# Patient Record
Sex: MALE | Race: WHITE | NOT HISPANIC OR LATINO | Employment: FULL TIME | URBAN - METROPOLITAN AREA
[De-identification: names, ages, dates, MRNs, and addresses within clinical notes are randomized per-mention and may not be internally consistent; named-entity substitution may affect disease eponyms.]

---

## 2019-05-07 ENCOUNTER — OFFICE VISIT (OUTPATIENT)
Dept: URGENT CARE | Facility: CLINIC | Age: 39
End: 2019-05-07
Payer: COMMERCIAL

## 2019-05-07 VITALS
HEIGHT: 68 IN | HEART RATE: 70 BPM | DIASTOLIC BLOOD PRESSURE: 84 MMHG | BODY MASS INDEX: 23.49 KG/M2 | OXYGEN SATURATION: 98 % | WEIGHT: 155 LBS | TEMPERATURE: 99 F | SYSTOLIC BLOOD PRESSURE: 120 MMHG

## 2019-05-07 DIAGNOSIS — K52.9 GASTROENTERITIS: ICD-10-CM

## 2019-05-07 DIAGNOSIS — J00 ACUTE NASOPHARYNGITIS: Primary | ICD-10-CM

## 2019-05-07 PROCEDURE — 99203 PR OFFICE/OUTPT VISIT, NEW, LEVL III, 30-44 MIN: ICD-10-PCS | Mod: S$GLB,,, | Performed by: PHYSICIAN ASSISTANT

## 2019-05-07 PROCEDURE — 3008F PR BODY MASS INDEX (BMI) DOCUMENTED: ICD-10-PCS | Mod: CPTII,S$GLB,, | Performed by: PHYSICIAN ASSISTANT

## 2019-05-07 PROCEDURE — 3008F BODY MASS INDEX DOCD: CPT | Mod: CPTII,S$GLB,, | Performed by: PHYSICIAN ASSISTANT

## 2019-05-07 PROCEDURE — 99203 OFFICE O/P NEW LOW 30 MIN: CPT | Mod: S$GLB,,, | Performed by: PHYSICIAN ASSISTANT

## 2019-05-07 RX ORDER — ONDANSETRON 4 MG/1
4 TABLET, ORALLY DISINTEGRATING ORAL EVERY 6 HOURS PRN
Qty: 20 TABLET | Refills: 0 | Status: SHIPPED | OUTPATIENT
Start: 2019-05-07 | End: 2022-06-03

## 2019-05-07 RX ORDER — PREDNISONE 20 MG/1
60 TABLET ORAL DAILY
Qty: 15 TABLET | Refills: 0 | Status: SHIPPED | OUTPATIENT
Start: 2019-05-07 | End: 2019-05-12

## 2019-05-07 NOTE — PATIENT INSTRUCTIONS
"- Rest.    - Drink plenty of fluids.    - Tylenol or Ibuprofen as directed as needed for fever/pain.   - Take over-the-counter claritin, zyrtec, allegra, or xyzal as directed.  You should NOT use a decongestant form (D) of this medication if you have a history of hypertension or heart disease.  - Use over the counter Flonase as directed for sinus congestion and postnasal drip.  - use nasal saline prior to Flonase.  - stop using Flonase if you developed nosebleeds.  - Use Ocean Spray Nasal Saline 1-3 puffs each nostril every 2-3 hours then blow out onto tissue. This is to irrigate the nasal passage way to clear the sinus openings. Use until sinus problem resolved.  - Take over the counter cough medication as directed as needed for cough.  You should avoid ones with "D" if you have high blood pressure.    - Follow up with your PCP or specialty clinic as directed in the next 1-2 weeks if not improved or as needed.  You can call [phone] to schedule an appointment with the appropriate provider.    - Go to the ED if your symptoms worsen.  - You must understand that you have received an Urgent Care treatment only and that you may be released before all of your medical problems are known or treated.   - You, the patient, will arrange for follow up care as instructed.   - If your condition worsens or fails to improve we recommend that you receive another evaluation at the ER immediately or contact your PCP to discuss your concerns or return here.       Adult Self-Care for Colds  Colds are caused by viruses. They can't be cured with antibiotics. However, you can ease symptoms and support your body's efforts to heal itself.  No matter which symptoms you have, be sure to:  · Drink plenty of fluids (water or clear soup)  · Stop smoking and drinking alcohol  · Get plenty of rest    Understand a fever  · Take your temperature several times a day. If your fever is 100.4°F (38.0°C) for more than a day, call your healthcare " provider.  · Relax, lie down. Go to bed if you want. Just get off your feet and rest. Also, drink plenty of fluids to avoid dehydration.  · Take acetaminophen or a nonsteroidal anti-inflammatory agent (NSAID), such as ibuprofen.  Treat a troubled nose kindly  · Breathe steam or heated humidified air to open blocked nasal passages.  a hot shower or use a vaporizer. Be careful not to get burned by the steam.  · Saline nasal sprays and decongestant tablets help open a stuffy nose. Antihistamines can also help, but they can cause side effects such as drowsiness and drying of the eyes, nose, and mouth.  Soothe a sore throat and cough  · Gargle every 2 hours with 1/4 teaspoon of salt dissolved in 1/2 cup of warm water. Suck on throat lozenges and cough drops to moisten your throat.  · Cough medicines are available but it is unclear how well they actually work.  · Take acetaminophen or an NSAID, such as ibuprofen, to ease throat pain  Ease digestive problems  · Put fluids back into your body. Take frequent sips of clear liquids such as water or broth. Avoid drinks that have a lot of sugar in them, such as juices and sodas. These can make diarrhea worse. Older children and adults can drink sports drinks.  · As your appetite returns, you can resume your normal diet. Ask your healthcare provider if there are any foods you should avoid.  When to seek medical care  When you first notice symptoms, ask your healthcare provider if antiviral medicines are appropriate. Antibiotics should not be taken for colds or flu. Also, call your healthcare provider if you have any of the following symptoms or if you aren't feeling better after 7 days:  · Shortness of breath  · Pain or pressure in the chest or belly (abdomen)  · Worsening symptoms, especially after a period of improvement  · Fever of 100.4°F  (38.0°C) or higher, or fever that doesn't go down with medicine  · Sudden dizziness or confusion  · Severe or continued  vomiting  · Signs of dehydration, including extreme thirst, dark urine, infrequent urination, dry mouth  · Spotted, red, or very sore throat   Date Last Reviewed: 12/1/2016 © 2000-2017 [employer]. [address] [place] [address], [place] [address]. All rights reserved. This information is not intended as a substitute for professional medical care. Always follow your healthcare professional's instructions.        Viral Upper Respiratory Illness (Adult)  You have a viral upper respiratory illness (URI), which is another term for the common cold. This illness is contagious during the first few days. It is spread through the air by coughing and sneezing. It may also be spread by direct contact (touching the sick person and then touching your own eyes, nose, or mouth). Frequent handwashing will decrease risk of spread. Most viral illnesses go away within 7 to 10 days with rest and simple home remedies. Sometimes the illness may last for several weeks. Antibiotics will not kill a virus, and they are generally not prescribed for this condition.    Home care  · If symptoms are severe, rest at home for the first 2 to 3 days. When you resume activity, don't let yourself get too tired.  · Avoid being exposed to cigarette smoke (yours or others).  · You may use acetaminophen or ibuprofen to control pain and fever, unless another medicine was prescribed. (Note: If you have chronic liver or kidney disease, have ever had a stomach ulcer or gastrointestinal bleeding, or are taking blood-thinning medicines, talk with your healthcare provider before using these medicines.) Aspirin should never be given to anyone under 18 years of age who is ill with a viral infection or fever. It may cause severe liver or brain damage.  · Your appetite may be poor, so a light diet is fine. Avoid dehydration by drinking 6 to 8 glasses of fluids per day (water, soft drinks, juices, tea, or soup). Extra fluids will help loosen secretions in the  nose and lungs.  · Over-the-counter cold medicines will not shorten the length of time youre sick, but they may be helpful for the following symptoms: cough, sore throat, and nasal and sinus congestion. (Note: Do not use decongestants if you have high blood pressure.)  Follow-up care  Follow up with your healthcare provider, or as advised.  When to seek medical advice  Call your healthcare provider right away if any of these occur:  · Cough with lots of colored sputum (mucus)  · Severe headache; face, neck, or ear pain  · Difficulty swallowing due to throat pain  · Fever of 100.4°F (38°C)  Call 911, or get immediate medical care  Call emergency services right away if any of these occur:  · Chest pain, shortness of breath, wheezing, or difficulty breathing  · Coughing up blood  · Inability to swallow due to throat pain  Date Last Reviewed: 9/13/2015  © 7453-7996 [employer]. [address] [place] [address]. All rights reserved. This information is not intended as a substitute for professional medical care. Always follow your healthcare professional's instructions.        Food Poisoning or Viral Gastroenteritis (Adult)  You have a stomach illness that is likely either food poisoning or viral gastroenteritis.  Food poisoning is illness that is passed along in food and affects the stomach and intestinal tract. It usually occurs from 1 to 24 hours after eating food that has spoiled. When it happens within a few hours of eating, it is often caused by toxins from bacteria in food that has not been cooked or refrigerated properly.  Viral gastroenteritis is also an illness from a virus that affects the stomach and intestinal tract. Many people call it the stomach flu, but it has nothing to do with influenza. In fact, it can happen from food poisoning, but it can also happen when germs are passed from person-to-person or contaminated surface (toothbrush, cutting board, toilet) to a person.  Either  illness can cause these symptoms:  · Abdominal pain and cramping  · Nausea  · Vomiting  · Diarrhea  · Fever and chills  · Loss of bowel control  The symptoms of food poisoning usually last 1 to 2 days. The symptoms of viral gastroenteritis can sometimes last up to 7 days but usually end sooner.  Antibiotics are not effective for either illness.  Other causes of gastroenteritis include bacteria and parasites which are not discussed here.  Home care  Follow all instructions given by your healthcare provider. Rest at home for the next 24 hours, or until you feel better. Avoid caffeine, tobacco, and alcohol. These can make diarrhea, cramping, and pain worse.  If taking medicines:  · Dont take over-the-counter diarrhea or nausea medicines unless your healthcare provider tells you to.  · You may use acetaminophen or NSAID medicines like ibuprofen or naproxen to reduce pain and fever. Dont use these if you have chronic liver or kidney disease, or ever had a stomach ulcer or GI bleeding. Talk with your healthcare provider first. Don't use NSAID medicines if you are already taking one for another condition (like arthritis) or are on daily aspirin therapy (such as for heart disease or after a stroke).  To prevent the spread of illness:  · Remember that washing with soap and water is the best way to prevent the spread of infection. Wash your hands before and after caring for a sick person.  · Clean the toilet after each use.  · Wash your hands or use alcohol-based hand  before eating.  · Wash your hands or use alcohol-based hand  before and after preparing food. Keep in mind that people with diarrhea or vomiting should not prepare food for others.  · Wash your hands or use alcohol-based hand  after using cutting boards, counter-tops, and knives (and other utensils) that have been in contact with raw foods.  · Wash and then peel fruits and vegetables.  · Keep uncooked meats away from cooked and  ready-to-eat foods.  · Use a food thermometer when cooking. Cook poultry to at least 165°F (74°C). Cook ground meat (beef, veal, pork, lamb) to at least 160°F (71°C). Cook fresh beef, veal, lamb, and pork to at least 145°F (63°C).  · Dont eat raw or undercooked eggs (poached or aniket side up), poultry, meat or unpasteurized milk and juices.  Food and drinks  The main goal while treating vomiting or diarrhea is to prevent dehydration. This is done by taking small amounts of liquids often.  · Keep in mind that liquids are more important than food right now.  · Drink only small amounts of liquids at a time.  · Dont force yourself to eat, especially if you are having cramping, vomiting, or diarrhea. Dont eat large amounts at a time, even if you are hungry.  · If you eat, avoid fatty, greasy, spicy, or fried foods.  · Dont eat dairy foods or drink milk if you have diarrhea. These can make diarrhea worse.  The first 24 hours you can try:  · Oral rehydration solutions, available at grocery stores and pharmacies. Sports drinks are not a good choice if you are very dehydrated. They have too much sugar and not enough electrolytes.  · Soft drinks without caffeine  · Ginger ale  · Water (plain or flavored)  · Decaf tea or coffee  · Clear broth, consommé, or bouillon  · Gelatin, popsicles, or frozen fruit juice bars   The second 24 hours, if you are feeling better, you can add:  · Hot cereal, plain toast, bread, rolls, or crackers  · Plain noodles, rice, mashed potatoes, chicken noodle soup, or rice soup  · Unsweetened canned fruit (no pineapple)  · Bananas  As you recover:  · Limit fat intake to less than 15 grams per day. Dont eat margarine, butter, oils, mayonnaise, sauces, gravies, fried foods, peanut butter, meat, poultry, or fish.  · Limit fiber. Dont eat raw or cooked vegetables, fresh fruits except bananas, and bran cereals.  · Limit caffeine and chocolate.  · Dont use spices or seasonings except salt.  · Resume a  normal diet over time, as you feel better and your symptoms improve.  · If the symptoms come back, go back to a simple diet or clear liquids.  Follow-up care  Follow up with your healthcare provider, or as advised. If a stool sample was taken or cultures were done, call the healthcare provider for the results as instructed.  Call 911  Call 911 if you have any of these symptoms:  · Trouble breathing  · Confusion  · Extreme drowsiness or trouble walking  · Loss of consciousness  · Rapid heart rate  · Chest pain  · Stiff neck  · Seizure  When to seek medical advice  Call your healthcare provider right away if any of these occur:  · Abdominal pain that gets worse  · Constant lower right abdominal pain  · Continued vomiting and inability to keep liquids down  · Diarrhea more than 5 times a day  · Blood in vomit or stool  · Dark urine or no urine for 8 hours, dry mouth and tongue, tiredness, weakness, or dizziness  · New rash  · You dont get better in 2 to 3 days  · Fever of 100.4°F (38°C) or higher that doesnt get lower with medicine  · You have new symptoms of arthritis  Date Last Reviewed: 1/3/2016  © 0354-8849 [employer]. [address] [place] [address], [place] [address]. All rights reserved. This information is not intended as a substitute for professional medical care. Always follow your healthcare professional's instructions.

## 2019-05-07 NOTE — LETTER
May 7, 2019      [facility] [address] [facility] [place] [address]  Phone: [phone]  Fax: [phone]       Patient: Shady Ferreira   YOB: 1980  Date of Visit: 05/07/2019    To Whom It May Concern:    Shady Ferreira  was at [facility] on 05/07/2019. He may return to work/school on 05/08/2019 with no restrictions. If you have any questions or concerns, or if I can be of further assistance, please do not hesitate to contact me.    Sincerely,        Ashlyn Evans PA-C

## 2019-05-07 NOTE — PROGRESS NOTES
"Subjective:       Patient ID: Shady Ferreira is a 38 y.o. male.    Vitals:  height is 5' 8" (1.727 m) and weight is 70.3 kg (155 lb). His temperature is 98.6 °F (37 °C). His blood pressure is 120/84 and his pulse is 70. His oxygen saturation is 98%.     Chief Complaint: URI    This is a 38 y.o. male   with Past Medical History:  No date: Herpes genitalia   and Past Surgical History:  No date: FINGER SURGERY; Left      Comment:  index  who presents today with a chief complaint of a cough,post nasal drip with sinus pressure, congestion, and sneezing that started yesterday morning.  He has had some mild sore throat.  No fever.  He also reports 2 episodes of diarrhea that started this morning that he describes as loose stool.  He reports some nausea as well, but no vomiting.      URI    This is a new problem. The current episode started in the past 7 days. The problem has been gradually worsening. There has been no fever. Associated symptoms include congestion, coughing, diarrhea, headaches, nausea, sinus pain, sneezing and a sore throat. Pertinent negatives include no abdominal pain, chest pain, ear pain, rash, vomiting or wheezing. He has tried decongestant (mucinex d) for the symptoms. The treatment provided mild relief.       Constitution: Negative for chills, sweating, fatigue and fever.   HENT: Positive for congestion, postnasal drip, sinus pain, sinus pressure and sore throat. Negative for ear pain and voice change.    Neck: Negative for painful lymph nodes.   Cardiovascular: Negative for chest pain.   Eyes: Negative for eye redness.   Respiratory: Positive for cough and sputum production. Negative for chest tightness, bloody sputum, COPD, shortness of breath, stridor, wheezing and asthma.    Gastrointestinal: Positive for nausea and diarrhea. Negative for abdominal pain and vomiting.   Musculoskeletal: Negative for muscle ache.   Skin: Negative for rash and erythema.   Allergic/Immunologic: Positive for " sneezing. Negative for seasonal allergies and asthma.   Neurological: Positive for headaches.   Hematologic/Lymphatic: Negative for swollen lymph nodes.       Objective:      Physical Exam   Constitutional: He is oriented to person, place, and time. He appears well-developed and well-nourished. No distress.   HENT:   Head: Normocephalic and atraumatic.   Right Ear: Hearing, tympanic membrane, external ear and ear canal normal.   Left Ear: Hearing, tympanic membrane, external ear and ear canal normal.   Nose: Mucosal edema present. Right sinus exhibits no maxillary sinus tenderness and no frontal sinus tenderness. Left sinus exhibits no maxillary sinus tenderness and no frontal sinus tenderness.   Mouth/Throat: Uvula is midline and oropharynx is clear and moist.   Eyes: Conjunctivae are normal.   Neck: Normal range of motion. Neck supple.   Cardiovascular: Normal rate and regular rhythm. Exam reveals no gallop and no friction rub.   No murmur heard.  Pulmonary/Chest: Effort normal and breath sounds normal. He has no wheezes. He has no rales.   Abdominal: Soft. Bowel sounds are normal. There is no tenderness.   Musculoskeletal: Normal range of motion.   Neurological: He is alert and oriented to person, place, and time.   Skin: Skin is warm and dry. No rash noted. No erythema.   Psychiatric: He has a normal mood and affect. His behavior is normal. Judgment and thought content normal.   Nursing note and vitals reviewed.      Assessment:       1. Acute nasopharyngitis    2. Gastroenteritis        Plan:         Acute nasopharyngitis  -     predniSONE (DELTASONE) 20 MG tablet; Take 3 tablets (60 mg total) by mouth once daily. for 5 days  Dispense: 15 tablet; Refill: 0    Gastroenteritis  -     ondansetron (ZOFRAN-ODT) 4 MG TbDL; Take 1 tablet (4 mg total) by mouth every 6 (six) hours as needed (nausea).  Dispense: 20 tablet; Refill: 0        Shady QUACH was seen today for uri.    Diagnoses and all orders for this  "visit:    Acute nasopharyngitis  -     predniSONE (DELTASONE) 20 MG tablet; Take 3 tablets (60 mg total) by mouth once daily. for 5 days    Gastroenteritis  -     ondansetron (ZOFRAN-ODT) 4 MG TbDL; Take 1 tablet (4 mg total) by mouth every 6 (six) hours as needed (nausea).      Patient Instructions     - Rest.    - Drink plenty of fluids.    - Tylenol or Ibuprofen as directed as needed for fever/pain.   - Take over-the-counter claritin, zyrtec, allegra, or xyzal as directed.  You should NOT use a decongestant form (D) of this medication if you have a history of hypertension or heart disease.  - Use over the counter Flonase as directed for sinus congestion and postnasal drip.  - use nasal saline prior to Flonase.  - stop using Flonase if you developed nosebleeds.  - Use Ocean Spray Nasal Saline 1-3 puffs each nostril every 2-3 hours then blow out onto tissue. This is to irrigate the nasal passage way to clear the sinus openings. Use until sinus problem resolved.  - Take over the counter cough medication as directed as needed for cough.  You should avoid ones with "D" if you have high blood pressure.    - Follow up with your PCP or specialty clinic as directed in the next 1-2 weeks if not improved or as needed.  You can call [phone] to schedule an appointment with the appropriate provider.    - Go to the ED if your symptoms worsen.  - You must understand that you have received an Urgent Care treatment only and that you may be released before all of your medical problems are known or treated.   - You, the patient, will arrange for follow up care as instructed.   - If your condition worsens or fails to improve we recommend that you receive another evaluation at the ER immediately or contact your PCP to discuss your concerns or return here.       Adult Self-Care for Colds  Colds are caused by viruses. They can't be cured with antibiotics. However, you can ease symptoms and support your body's efforts to heal " itself.  No matter which symptoms you have, be sure to:  · Drink plenty of fluids (water or clear soup)  · Stop smoking and drinking alcohol  · Get plenty of rest    Understand a fever  · Take your temperature several times a day. If your fever is 100.4°F (38.0°C) for more than a day, call your healthcare provider.  · Relax, lie down. Go to bed if you want. Just get off your feet and rest. Also, drink plenty of fluids to avoid dehydration.  · Take acetaminophen or a nonsteroidal anti-inflammatory agent (NSAID), such as ibuprofen.  Treat a troubled nose kindly  · Breathe steam or heated humidified air to open blocked nasal passages.  a hot shower or use a vaporizer. Be careful not to get burned by the steam.  · Saline nasal sprays and decongestant tablets help open a stuffy nose. Antihistamines can also help, but they can cause side effects such as drowsiness and drying of the eyes, nose, and mouth.  Soothe a sore throat and cough  · Gargle every 2 hours with 1/4 teaspoon of salt dissolved in 1/2 cup of warm water. Suck on throat lozenges and cough drops to moisten your throat.  · Cough medicines are available but it is unclear how well they actually work.  · Take acetaminophen or an NSAID, such as ibuprofen, to ease throat pain  Ease digestive problems  · Put fluids back into your body. Take frequent sips of clear liquids such as water or broth. Avoid drinks that have a lot of sugar in them, such as juices and sodas. These can make diarrhea worse. Older children and adults can drink sports drinks.  · As your appetite returns, you can resume your normal diet. Ask your healthcare provider if there are any foods you should avoid.  When to seek medical care  When you first notice symptoms, ask your healthcare provider if antiviral medicines are appropriate. Antibiotics should not be taken for colds or flu. Also, call your healthcare provider if you have any of the following symptoms or if you aren't feeling  better after 7 days:  · Shortness of breath  · Pain or pressure in the chest or belly (abdomen)  · Worsening symptoms, especially after a period of improvement  · Fever of 100.4°F  (38.0°C) or higher, or fever that doesn't go down with medicine  · Sudden dizziness or confusion  · Severe or continued vomiting  · Signs of dehydration, including extreme thirst, dark urine, infrequent urination, dry mouth  · Spotted, red, or very sore throat   Date Last Reviewed: 12/1/2016 © 2000-2017 [employer]. [address] [place] [id number]. All rights reserved. This information is not intended as a substitute for professional medical care. Always follow your healthcare professional's instructions.        Viral Upper Respiratory Illness (Adult)  You have a viral upper respiratory illness (URI), which is another term for the common cold. This illness is contagious during the first few days. It is spread through the air by coughing and sneezing. It may also be spread by direct contact (touching the sick person and then touching your own eyes, nose, or mouth). Frequent handwashing will decrease risk of spread. Most viral illnesses go away within 7 to 10 days with rest and simple home remedies. Sometimes the illness may last for several weeks. Antibiotics will not kill a virus, and they are generally not prescribed for this condition.    Home care  · If symptoms are severe, rest at home for the first 2 to 3 days. When you resume activity, don't let yourself get too tired.  · Avoid being exposed to cigarette smoke (yours or others).  · You may use acetaminophen or ibuprofen to control pain and fever, unless another medicine was prescribed. (Note: If you have chronic liver or kidney disease, have ever had a stomach ulcer or gastrointestinal bleeding, or are taking blood-thinning medicines, talk with your healthcare provider before using these medicines.) Aspirin should never be given to anyone under 18 years of  age who is ill with a viral infection or fever. It may cause severe liver or brain damage.  · Your appetite may be poor, so a light diet is fine. Avoid dehydration by drinking 6 to 8 glasses of fluids per day (water, soft drinks, juices, tea, or soup). Extra fluids will help loosen secretions in the nose and lungs.  · Over-the-counter cold medicines will not shorten the length of time youre sick, but they may be helpful for the following symptoms: cough, sore throat, and nasal and sinus congestion. (Note: Do not use decongestants if you have high blood pressure.)  Follow-up care  Follow up with your healthcare provider, or as advised.  When to seek medical advice  Call your healthcare provider right away if any of these occur:  · Cough with lots of colored sputum (mucus)  · Severe headache; face, neck, or ear pain  · Difficulty swallowing due to throat pain  · Fever of 100.4°F (38°C)  Call 911, or get immediate medical care  Call emergency services right away if any of these occur:  · Chest pain, shortness of breath, wheezing, or difficulty breathing  · Coughing up blood  · Inability to swallow due to throat pain  Date Last Reviewed: 9/13/2015  © 8275-8554 [employer]. [address] [place] [address], [place] [address]. All rights reserved. This information is not intended as a substitute for professional medical care. Always follow your healthcare professional's instructions.        Food Poisoning or Viral Gastroenteritis (Adult)  You have a stomach illness that is likely either food poisoning or viral gastroenteritis.  Food poisoning is illness that is passed along in food and affects the stomach and intestinal tract. It usually occurs from 1 to 24 hours after eating food that has spoiled. When it happens within a few hours of eating, it is often caused by toxins from bacteria in food that has not been cooked or refrigerated properly.  Viral gastroenteritis is also an illness from a virus that affects the  stomach and intestinal tract. Many people call it the stomach flu, but it has nothing to do with influenza. In fact, it can happen from food poisoning, but it can also happen when germs are passed from person-to-person or contaminated surface (toothbrush, cutting board, toilet) to a person.  Either illness can cause these symptoms:  · Abdominal pain and cramping  · Nausea  · Vomiting  · Diarrhea  · Fever and chills  · Loss of bowel control  The symptoms of food poisoning usually last 1 to 2 days. The symptoms of viral gastroenteritis can sometimes last up to 7 days but usually end sooner.  Antibiotics are not effective for either illness.  Other causes of gastroenteritis include bacteria and parasites which are not discussed here.  Home care  Follow all instructions given by your healthcare provider. Rest at home for the next 24 hours, or until you feel better. Avoid caffeine, tobacco, and alcohol. These can make diarrhea, cramping, and pain worse.  If taking medicines:  · Dont take over-the-counter diarrhea or nausea medicines unless your healthcare provider tells you to.  · You may use acetaminophen or NSAID medicines like ibuprofen or naproxen to reduce pain and fever. Dont use these if you have chronic liver or kidney disease, or ever had a stomach ulcer or GI bleeding. Talk with your healthcare provider first. Don't use NSAID medicines if you are already taking one for another condition (like arthritis) or are on daily aspirin therapy (such as for heart disease or after a stroke).  To prevent the spread of illness:  · Remember that washing with soap and water is the best way to prevent the spread of infection. Wash your hands before and after caring for a sick person.  · Clean the toilet after each use.  · Wash your hands or use alcohol-based hand  before eating.  · Wash your hands or use alcohol-based hand  before and after preparing food. Keep in mind that people with diarrhea or  vomiting should not prepare food for others.  · Wash your hands or use alcohol-based hand  after using cutting boards, counter-tops, and knives (and other utensils) that have been in contact with raw foods.  · Wash and then peel fruits and vegetables.  · Keep uncooked meats away from cooked and ready-to-eat foods.  · Use a food thermometer when cooking. Cook poultry to at least 165°F (74°C). Cook ground meat (beef, veal, pork, lamb) to at least 160°F (71°C). Cook fresh beef, veal, lamb, and pork to at least 145°F (63°C).  · Dont eat raw or undercooked eggs (poached or aniket side up), poultry, meat or unpasteurized milk and juices.  Food and drinks  The main goal while treating vomiting or diarrhea is to prevent dehydration. This is done by taking small amounts of liquids often.  · Keep in mind that liquids are more important than food right now.  · Drink only small amounts of liquids at a time.  · Dont force yourself to eat, especially if you are having cramping, vomiting, or diarrhea. Dont eat large amounts at a time, even if you are hungry.  · If you eat, avoid fatty, greasy, spicy, or fried foods.  · Dont eat dairy foods or drink milk if you have diarrhea. These can make diarrhea worse.  The first 24 hours you can try:  · Oral rehydration solutions, available at grocery stores and pharmacies. Sports drinks are not a good choice if you are very dehydrated. They have too much sugar and not enough electrolytes.  · Soft drinks without caffeine  · Ginger ale  · Water (plain or flavored)  · Decaf tea or coffee  · Clear broth, consommé, or bouillon  · Gelatin, popsicles, or frozen fruit juice bars   The second 24 hours, if you are feeling better, you can add:  · Hot cereal, plain toast, bread, rolls, or crackers  · Plain noodles, rice, mashed potatoes, chicken noodle soup, or rice soup  · Unsweetened canned fruit (no pineapple)  · Bananas  As you recover:  · Limit fat intake to less than 15 grams per day.  Dont eat margarine, butter, oils, mayonnaise, sauces, gravies, fried foods, peanut butter, meat, poultry, or fish.  · Limit fiber. Dont eat raw or cooked vegetables, fresh fruits except bananas, and bran cereals.  · Limit caffeine and chocolate.  · Dont use spices or seasonings except salt.  · Resume a normal diet over time, as you feel better and your symptoms improve.  · If the symptoms come back, go back to a simple diet or clear liquids.  Follow-up care  Follow up with your healthcare provider, or as advised. If a stool sample was taken or cultures were done, call the healthcare provider for the results as instructed.  Call 911  Call 911 if you have any of these symptoms:  · Trouble breathing  · Confusion  · Extreme drowsiness or trouble walking  · Loss of consciousness  · Rapid heart rate  · Chest pain  · Stiff neck  · Seizure  When to seek medical advice  Call your healthcare provider right away if any of these occur:  · Abdominal pain that gets worse  · Constant lower right abdominal pain  · Continued vomiting and inability to keep liquids down  · Diarrhea more than 5 times a day  · Blood in vomit or stool  · Dark urine or no urine for 8 hours, dry mouth and tongue, tiredness, weakness, or dizziness  · New rash  · You dont get better in 2 to 3 days  · Fever of 100.4°F (38°C) or higher that doesnt get lower with medicine  · You have new symptoms of arthritis  Date Last Reviewed: 1/3/2016  © 8511-6708 [employer]. [address] [place] [address], [place] [address]. All rights reserved. This information is not intended as a substitute for professional medical care. Always follow your healthcare professional's instructions.

## 2019-05-11 ENCOUNTER — TELEPHONE (OUTPATIENT)
Dept: URGENT CARE | Facility: CLINIC | Age: 39
End: 2019-05-11

## 2019-05-30 ENCOUNTER — TELEPHONE (OUTPATIENT)
Dept: FAMILY MEDICINE | Facility: CLINIC | Age: 39
End: 2019-05-30

## 2019-05-30 NOTE — TELEPHONE ENCOUNTER
----- Message from Tho Plata MD sent at 5/28/2019  3:56 PM CDT -----  Regarding: New patient  This is the  of one of my patient's.  Please schedule him a Physical New appointment.  No labs prior.  Ok to override my schedule to allow a new adult.     Thank you,  Aroldo

## 2019-06-11 ENCOUNTER — OFFICE VISIT (OUTPATIENT)
Dept: FAMILY MEDICINE | Facility: CLINIC | Age: 39
End: 2019-06-11
Payer: COMMERCIAL

## 2019-06-11 ENCOUNTER — LAB VISIT (OUTPATIENT)
Dept: LAB | Facility: HOSPITAL | Age: 39
End: 2019-06-11
Attending: INTERNAL MEDICINE
Payer: COMMERCIAL

## 2019-06-11 VITALS
TEMPERATURE: 98 F | BODY MASS INDEX: 24.63 KG/M2 | HEIGHT: 68 IN | WEIGHT: 162.5 LBS | HEART RATE: 82 BPM | SYSTOLIC BLOOD PRESSURE: 112 MMHG | OXYGEN SATURATION: 98 % | DIASTOLIC BLOOD PRESSURE: 80 MMHG

## 2019-06-11 DIAGNOSIS — M79.10 MYALGIA: ICD-10-CM

## 2019-06-11 DIAGNOSIS — M79.641 PAIN IN BOTH HANDS: ICD-10-CM

## 2019-06-11 DIAGNOSIS — M79.642 PAIN IN BOTH HANDS: ICD-10-CM

## 2019-06-11 DIAGNOSIS — G43.109 OCULAR MIGRAINE: ICD-10-CM

## 2019-06-11 DIAGNOSIS — Z23 NEED FOR VACCINATION FOR PNEUMOCOCCUS: ICD-10-CM

## 2019-06-11 DIAGNOSIS — Z00.00 ROUTINE MEDICAL EXAM: Primary | ICD-10-CM

## 2019-06-11 DIAGNOSIS — F17.200 TOBACCO DEPENDENCE: ICD-10-CM

## 2019-06-11 DIAGNOSIS — Z00.00 ROUTINE MEDICAL EXAM: ICD-10-CM

## 2019-06-11 LAB
ALBUMIN SERPL BCP-MCNC: 3.9 G/DL (ref 3.5–5.2)
ALP SERPL-CCNC: 106 U/L (ref 55–135)
ALT SERPL W/O P-5'-P-CCNC: 18 U/L (ref 10–44)
ANION GAP SERPL CALC-SCNC: 10 MMOL/L (ref 8–16)
AST SERPL-CCNC: 22 U/L (ref 10–40)
BASOPHILS # BLD AUTO: 0.12 K/UL (ref 0–0.2)
BASOPHILS NFR BLD: 1.3 % (ref 0–1.9)
BILIRUB SERPL-MCNC: 0.5 MG/DL (ref 0.1–1)
BUN SERPL-MCNC: 9 MG/DL (ref 6–20)
CALCIUM SERPL-MCNC: 9.7 MG/DL (ref 8.7–10.5)
CHLORIDE SERPL-SCNC: 107 MMOL/L (ref 95–110)
CHOLEST SERPL-MCNC: 209 MG/DL (ref 120–199)
CHOLEST/HDLC SERPL: 6.1 {RATIO} (ref 2–5)
CO2 SERPL-SCNC: 23 MMOL/L (ref 23–29)
CREAT SERPL-MCNC: 1.3 MG/DL (ref 0.5–1.4)
DIFFERENTIAL METHOD: NORMAL
EOSINOPHIL # BLD AUTO: 0.3 K/UL (ref 0–0.5)
EOSINOPHIL NFR BLD: 3.4 % (ref 0–8)
ERYTHROCYTE [DISTWIDTH] IN BLOOD BY AUTOMATED COUNT: 12.8 % (ref 11.5–14.5)
EST. GFR  (AFRICAN AMERICAN): >60 ML/MIN/1.73 M^2
EST. GFR  (NON AFRICAN AMERICAN): >60 ML/MIN/1.73 M^2
GLUCOSE SERPL-MCNC: 76 MG/DL (ref 70–110)
HCT VFR BLD AUTO: 50 % (ref 40–54)
HDLC SERPL-MCNC: 34 MG/DL (ref 40–75)
HDLC SERPL: 16.3 % (ref 20–50)
HGB BLD-MCNC: 16.4 G/DL (ref 14–18)
IMM GRANULOCYTES # BLD AUTO: 0.04 K/UL (ref 0–0.04)
IMM GRANULOCYTES NFR BLD AUTO: 0.4 % (ref 0–0.5)
LDLC SERPL CALC-MCNC: 137.6 MG/DL (ref 63–159)
LYMPHOCYTES # BLD AUTO: 2.3 K/UL (ref 1–4.8)
LYMPHOCYTES NFR BLD: 25.5 % (ref 18–48)
MAGNESIUM SERPL-MCNC: 2 MG/DL (ref 1.6–2.6)
MCH RBC QN AUTO: 29.1 PG (ref 27–31)
MCHC RBC AUTO-ENTMCNC: 32.8 G/DL (ref 32–36)
MCV RBC AUTO: 89 FL (ref 82–98)
MONOCYTES # BLD AUTO: 0.8 K/UL (ref 0.3–1)
MONOCYTES NFR BLD: 9.2 % (ref 4–15)
NEUTROPHILS # BLD AUTO: 5.4 K/UL (ref 1.8–7.7)
NEUTROPHILS NFR BLD: 60.2 % (ref 38–73)
NONHDLC SERPL-MCNC: 175 MG/DL
NRBC BLD-RTO: 0 /100 WBC
PLATELET # BLD AUTO: 346 K/UL (ref 150–350)
PMV BLD AUTO: 11.4 FL (ref 9.2–12.9)
POTASSIUM SERPL-SCNC: 4.3 MMOL/L (ref 3.5–5.1)
PROT SERPL-MCNC: 7.2 G/DL (ref 6–8.4)
RBC # BLD AUTO: 5.64 M/UL (ref 4.6–6.2)
RHEUMATOID FACT SERPL-ACNC: <10 IU/ML (ref 0–15)
SODIUM SERPL-SCNC: 140 MMOL/L (ref 136–145)
TRIGL SERPL-MCNC: 187 MG/DL (ref 30–150)
WBC # BLD AUTO: 8.94 K/UL (ref 3.9–12.7)

## 2019-06-11 PROCEDURE — 90732 PNEUMOCOCCAL POLYSACCHARIDE VACCINE 23-VALENT =>2YO SQ IM: ICD-10-PCS | Mod: S$GLB,,, | Performed by: INTERNAL MEDICINE

## 2019-06-11 PROCEDURE — 80061 LIPID PANEL: CPT

## 2019-06-11 PROCEDURE — 86431 RHEUMATOID FACTOR QUANT: CPT

## 2019-06-11 PROCEDURE — 36415 COLL VENOUS BLD VENIPUNCTURE: CPT | Mod: PO

## 2019-06-11 PROCEDURE — 86200 CCP ANTIBODY: CPT

## 2019-06-11 PROCEDURE — 99999 PR PBB SHADOW E&M-EST. PATIENT-LVL IV: CPT | Mod: PBBFAC,,, | Performed by: INTERNAL MEDICINE

## 2019-06-11 PROCEDURE — 85025 COMPLETE CBC W/AUTO DIFF WBC: CPT

## 2019-06-11 PROCEDURE — 90471 IMMUNIZATION ADMIN: CPT | Mod: S$GLB,,, | Performed by: INTERNAL MEDICINE

## 2019-06-11 PROCEDURE — 99385 PREV VISIT NEW AGE 18-39: CPT | Mod: 25,S$GLB,, | Performed by: INTERNAL MEDICINE

## 2019-06-11 PROCEDURE — 99385 PR PREVENTIVE VISIT,NEW,18-39: ICD-10-PCS | Mod: 25,S$GLB,, | Performed by: INTERNAL MEDICINE

## 2019-06-11 PROCEDURE — 90732 PPSV23 VACC 2 YRS+ SUBQ/IM: CPT | Mod: S$GLB,,, | Performed by: INTERNAL MEDICINE

## 2019-06-11 PROCEDURE — 90471 PNEUMOCOCCAL POLYSACCHARIDE VACCINE 23-VALENT =>2YO SQ IM: ICD-10-PCS | Mod: S$GLB,,, | Performed by: INTERNAL MEDICINE

## 2019-06-11 PROCEDURE — 99999 PR PBB SHADOW E&M-EST. PATIENT-LVL IV: ICD-10-PCS | Mod: PBBFAC,,, | Performed by: INTERNAL MEDICINE

## 2019-06-11 PROCEDURE — 83735 ASSAY OF MAGNESIUM: CPT

## 2019-06-11 PROCEDURE — 80053 COMPREHEN METABOLIC PANEL: CPT

## 2019-06-11 NOTE — PATIENT INSTRUCTIONS
"Below is a list of free Apps that you may find helpful (some of them may not apply to you since this is a general list of helpful Apps):    Android & Apple users:  EatFit - Created by [facility] nutritionists.  Tons of great recipes, links to >100 restaurants in town with healthy choices, and shopping guides.    Fooducate - Helps with healthy diet and weight loss  Shop Well - Scan barcodes to foods to see if it is healthy or not.  It will also suggest healthier alternatives  Lose It - Calorie tracking and goal setting for weight loss.  Peer support is also available  Calorie Counter and Diet Tracker by My[employer]itness[employer] - Helps count calories for food intake and calories burned during exercise  [employer] Active - has pictures and videos of preloaded workout routines    Headspace - Guides your through meditation.  The first 10 programs are free.  Stop, Think, and Breath - customizable meditation re with ability to perform "check-ins"          "

## 2019-06-11 NOTE — ASSESSMENT & PLAN NOTE
Counseled on abortive therapy.  Discussed medication induced HAs as well.  Recommended he have baseline eye exam.

## 2019-06-11 NOTE — ASSESSMENT & PLAN NOTE
I counseled the patient on smoking cessation, risks associated with smoking, having a quit date, nicotine replacement, medications that can aid in cessation, and having a plan for future cravings.  The patient stated that he is willing to consider the smoking cessation clinic

## 2019-06-11 NOTE — PROGRESS NOTES
Assessment & Plan (all problems are new to me)  Problem List Items Addressed This Visit        Other    Tobacco dependence (Chronic)    Current Assessment & Plan     I counseled the patient on smoking cessation, risks associated with smoking, having a quit date, nicotine replacement, medications that can aid in cessation, and having a plan for future cravings.  The patient stated that he is willing to consider the smoking cessation clinic         Relevant Orders    Ambulatory referral to Smoking Cessation Program      Other Visit Diagnoses     Routine medical exam    -  Primary  -    Discussed healthy diet, regular exercise, necessary labs, age appropriate cancer screening, and routine vaccinations.       Relevant Orders    Lipid panel    CBC auto differential    Comprehensive metabolic panel    Myalgia    -  Check Mg    Relevant Orders    Magnesium    Pain in both hands    -  Suspect just due to heavy use but having stiffness for an hour each morning.  Check labs    Relevant Orders    Rheumatoid factor    Cyclic citrul peptide antibody, IgG    Need for vaccination for pneumococcus    -  vaccinate    Relevant Orders    Pneumococcal Polysaccharide Vaccine (23 Valent) (SQ/IM) (Completed)            Health Maintenance reviewed, as above.    Follow-up: Follow up in about 1 year (around 6/11/2020) for Routine Physical.    _____________________________________________________________________    Chief Complaint  Chief Complaint   Patient presents with    Annual Exam       HPI  Shady Ferreira is a 38 y.o. male with multiple medical diagnoses as listed in the medical history and problem list that presents for routine physical.  Pt is new to me and to this clinic.      No acute complaints.  He has been having hand pain and stiffness that lasts ~1 hour in the AM.  + heavy use of hands at work.  No synovitis symptoms.     He has been having regular HAs.  Taking tylenol or ibuprofen 3-4x a week for this.  Occasionally has  more severe HA with associated with blurry colors in the field of vision.  Starts in the periphery of his vision and grows in size.  Then HA.  Has to go lay down and take a nap when this happens.       PAST MEDICAL HISTORY:  Past Medical History:   Diagnosis Date    Calcaneus fracture, right     Herpes genitalia        PAST SURGICAL HISTORY:  Past Surgical History:   Procedure Laterality Date    FINGER SURGERY Left     index tendon repair       SOCIAL HISTORY:  Social History     Socioeconomic History    Marital status: Significant Other     Spouse name: Not on file    Number of children: Not on file    Years of education: Not on file    Highest education level: Not on file   Occupational History    Not on file   Social Needs    Financial resource strain: Not on file    Food insecurity:     Worry: Not on file     Inability: Not on file    Transportation needs:     Medical: Not on file     Non-medical: Not on file   Tobacco Use    Smoking status: Current Every Day Smoker     Packs/day: 1.00     Years: 24.00     Pack years: 24.00     Types: Cigarettes     Start date: 12/20/1985    Smokeless tobacco: Never Used   Substance and Sexual Activity    Alcohol use: Yes     Comment: rare    Drug use: No    Sexual activity: Yes     Partners: Female     Comment:    Lifestyle    Physical activity:     Days per week: Not on file     Minutes per session: Not on file    Stress: Not on file   Relationships    Social connections:     Talks on phone: Not on file     Gets together: Not on file     Attends Confucianism service: Not on file     Active member of club or organization: Not on file     Attends meetings of clubs or organizations: Not on file     Relationship status: Not on file   Other Topics Concern    Not on file   Social History Narrative    Not on file       FAMILY HISTORY:  Family History   Problem Relation Age of Onset    Alzheimer's disease Maternal Grandmother     Alzheimer's disease Maternal  Grandfather     Cancer Paternal Grandmother         breast    Heart disease Paternal Grandfather        ALLERGIES AND MEDICATIONS: updated and reviewed.  Review of patient's allergies indicates:  No Known Allergies  Current Outpatient Medications   Medication Sig Dispense Refill    ondansetron (ZOFRAN-ODT) 4 MG TbDL Take 1 tablet (4 mg total) by mouth every 6 (six) hours as needed (nausea). 20 tablet 0     No current facility-administered medications for this visit.          ROS  Review of Systems   Constitutional: Negative for chills, fatigue, fever and unexpected weight change.   HENT: Positive for congestion and rhinorrhea. Negative for dental problem, ear discharge, ear pain, hearing loss, postnasal drip, sinus pressure, sore throat and trouble swallowing.    Eyes: Negative for pain, discharge, redness, itching and visual disturbance.   Respiratory: Negative for cough, chest tightness, shortness of breath and wheezing.    Cardiovascular: Negative for chest pain, palpitations and leg swelling.   Gastrointestinal: Negative for abdominal distention, abdominal pain, blood in stool, constipation, diarrhea, nausea and vomiting.        No melena   Endocrine: Negative for cold intolerance, heat intolerance, polydipsia, polyphagia and polyuria.        No nocturia   Genitourinary: Negative for decreased urine volume, difficulty urinating, discharge, dysuria, flank pain, frequency, hematuria, penile pain, penile swelling, scrotal swelling, testicular pain and urgency.   Musculoskeletal: Positive for arthralgias (hands and right heal). Negative for gait problem, joint swelling, myalgias, neck pain and neck stiffness.   Skin: Negative for color change, pallor and rash.   Neurological: Positive for headaches. Negative for dizziness, tremors, seizures, syncope, weakness and light-headedness.   Hematological: Negative for adenopathy. Does not bruise/bleed easily.   Psychiatric/Behavioral: Negative for confusion, decreased  "concentration, sleep disturbance and suicidal ideas. The patient is not nervous/anxious.         No depression         Physical Exam  Vitals:    06/11/19 0748 06/11/19 0826   BP: (!) 120/90 112/80   Pulse: 82    Temp: 98.3 °F (36.8 °C)    SpO2: 98%    Weight: 73.7 kg (162 lb 7.7 oz)    Height: 5' 8" (1.727 m)     Body mass index is 24.7 kg/m².  Weight: 73.7 kg (162 lb 7.7 oz)   Height: 5' 8" (172.7 cm)   Physical Exam   Constitutional: He is oriented to person, place, and time. He appears well-developed and well-nourished. No distress.   HENT:   Head: Normocephalic and atraumatic.   Right Ear: Tympanic membrane, external ear and ear canal normal.   Left Ear: Tympanic membrane, external ear and ear canal normal.   Nose: Nose normal. No septal deviation. Right sinus exhibits no maxillary sinus tenderness and no frontal sinus tenderness. Left sinus exhibits no maxillary sinus tenderness and no frontal sinus tenderness.   Mouth/Throat: Uvula is midline, oropharynx is clear and moist and mucous membranes are normal. No tonsillar exudate.   Eyes: Pupils are equal, round, and reactive to light. Conjunctivae and EOM are normal. Right eye exhibits no discharge. Left eye exhibits no discharge. No scleral icterus.   Neck: Neck supple. No JVD present. No spinous process tenderness and no muscular tenderness present. Carotid bruit is not present. No tracheal deviation present. No thyroid mass and no thyromegaly present.   Cardiovascular: Normal rate, regular rhythm, normal heart sounds and intact distal pulses. Exam reveals no S3, no S4 and no friction rub.   No murmur heard.  Pulmonary/Chest: Effort normal and breath sounds normal. He has no wheezes. He has no rhonchi. He has no rales.   Abdominal: Soft. Bowel sounds are normal. He exhibits no distension. There is no tenderness. There is no rebound, no guarding and no CVA tenderness.   Musculoskeletal: Normal range of motion. He exhibits no edema or tenderness. "   Lymphadenopathy:        Head (right side): No submental and no submandibular adenopathy present.        Head (left side): No submental and no submandibular adenopathy present.     He has no cervical adenopathy.   Neurological: He is alert and oriented to person, place, and time. Coordination normal.   Motor grossly intact.  Sensation grossly normal.  Symmetric facial movements palate elevated symmetrically tongue midline    Skin: Skin is warm and dry. Capillary refill takes less than 2 seconds. No rash noted. No cyanosis. Nails show no clubbing.   Psychiatric: He has a normal mood and affect. His speech is normal and behavior is normal. Thought content normal. Cognition and memory are normal.           Health Maintenance       Date Due Completion Date    Lipid Panel 1979 ---    Pneumococcal Vaccine (Medium Risk) (1 of 1 - PPSV23) 12/20/1998 ---    Influenza Vaccine 08/01/2019 ---    TETANUS VACCINE 08/28/2025 8/28/2015

## 2019-06-13 LAB — CCP AB SER IA-ACNC: <0.5 U/ML

## 2019-06-14 ENCOUNTER — PATIENT MESSAGE (OUTPATIENT)
Dept: FAMILY MEDICINE | Facility: CLINIC | Age: 39
End: 2019-06-14

## 2019-06-14 DIAGNOSIS — E78.2 MIXED HYPERLIPIDEMIA: ICD-10-CM

## 2020-03-02 ENCOUNTER — OFFICE VISIT (OUTPATIENT)
Dept: URGENT CARE | Facility: CLINIC | Age: 40
End: 2020-03-02
Payer: COMMERCIAL

## 2020-03-02 VITALS
HEART RATE: 70 BPM | TEMPERATURE: 97 F | DIASTOLIC BLOOD PRESSURE: 88 MMHG | BODY MASS INDEX: 24.55 KG/M2 | HEIGHT: 68 IN | OXYGEN SATURATION: 98 % | WEIGHT: 162 LBS | SYSTOLIC BLOOD PRESSURE: 128 MMHG

## 2020-03-02 DIAGNOSIS — B96.89 BACTERIAL SINUSITIS: Primary | ICD-10-CM

## 2020-03-02 DIAGNOSIS — J32.9 BACTERIAL SINUSITIS: Primary | ICD-10-CM

## 2020-03-02 PROCEDURE — 99214 PR OFFICE/OUTPT VISIT, EST, LEVL IV, 30-39 MIN: ICD-10-PCS | Mod: S$GLB,,, | Performed by: PHYSICIAN ASSISTANT

## 2020-03-02 PROCEDURE — 99214 OFFICE O/P EST MOD 30 MIN: CPT | Mod: S$GLB,,, | Performed by: PHYSICIAN ASSISTANT

## 2020-03-02 RX ORDER — AMOXICILLIN AND CLAVULANATE POTASSIUM 875; 125 MG/1; MG/1
1 TABLET, FILM COATED ORAL 2 TIMES DAILY
Qty: 14 TABLET | Refills: 0 | Status: SHIPPED | OUTPATIENT
Start: 2020-03-02 | End: 2020-03-09

## 2020-03-02 RX ORDER — AMOXICILLIN AND CLAVULANATE POTASSIUM 875; 125 MG/1; MG/1
1 TABLET, FILM COATED ORAL 2 TIMES DAILY
Qty: 14 TABLET | Refills: 0 | Status: SHIPPED | OUTPATIENT
Start: 2020-03-02 | End: 2020-03-02

## 2020-03-02 NOTE — PROGRESS NOTES
"Subjective:       Patient ID: Shady Ferreira is a 39 y.o. male.    Vitals:  height is 5' 8" (1.727 m) and weight is 73.5 kg (162 lb). His temperature is 97.1 °F (36.2 °C). His blood pressure is 128/88 and his pulse is 70. His oxygen saturation is 98%.     Chief Complaint: URI    Pt states he has a cough, congestion, post nasal drip , sneezing for 4 days. Congestion in sinuses and chest seems to be worsening. No fevers, SOB or pleuritic chest pain. Smoker. No hx of lung dz, pneumonia or asthma.   Some relief with mucinex    URI    This is a new problem. The current episode started in the past 7 days. The problem has been gradually worsening. Associated symptoms include congestion, coughing, headaches and sneezing. Pertinent negatives include no ear pain, nausea, rash, sinus pain, sore throat, vomiting or wheezing. Treatments tried: mucinex.       Constitution: Negative for chills, sweating, fatigue and fever.   HENT: Positive for congestion and postnasal drip. Negative for ear pain, sinus pain, sinus pressure, sore throat and voice change.    Neck: Negative for painful lymph nodes.   Eyes: Negative for eye redness.   Respiratory: Positive for cough and sputum production. Negative for chest tightness, bloody sputum, COPD, shortness of breath, stridor, wheezing and asthma.    Gastrointestinal: Negative for nausea and vomiting.   Musculoskeletal: Negative for muscle ache.   Skin: Negative for rash.   Allergic/Immunologic: Positive for sneezing. Negative for seasonal allergies and asthma.   Neurological: Positive for headaches.   Hematologic/Lymphatic: Negative for swollen lymph nodes.       Objective:      Physical Exam   Constitutional: He is oriented to person, place, and time. He appears well-developed and well-nourished. He is cooperative.  Non-toxic appearance. He does not have a sickly appearance. He does not appear ill. No distress.   HENT:   Head: Normocephalic and atraumatic.   Right Ear: Hearing, tympanic " membrane, external ear and ear canal normal.   Left Ear: Hearing, tympanic membrane, external ear and ear canal normal.   Nose: Mucosal edema and rhinorrhea present. No nasal deformity. No epistaxis. Right sinus exhibits maxillary sinus tenderness and frontal sinus tenderness. Left sinus exhibits maxillary sinus tenderness and frontal sinus tenderness.   Mouth/Throat: Uvula is midline, oropharynx is clear and moist and mucous membranes are normal. No trismus in the jaw. Normal dentition. No uvula swelling. No oropharyngeal exudate, posterior oropharyngeal edema or posterior oropharyngeal erythema.   Eyes: Conjunctivae and lids are normal. No scleral icterus.   Neck: Trachea normal, full passive range of motion without pain and phonation normal. Neck supple. No neck rigidity. No edema and no erythema present.   Cardiovascular: Normal rate, regular rhythm, normal heart sounds, intact distal pulses and normal pulses.   Pulmonary/Chest: Effort normal and breath sounds normal. No respiratory distress. He has no decreased breath sounds. He has no rhonchi.   Abdominal: Normal appearance.   Musculoskeletal: Normal range of motion. He exhibits no edema or deformity.   Neurological: He is alert and oriented to person, place, and time. He exhibits normal muscle tone. Coordination normal.   Skin: Skin is warm, dry, intact, not diaphoretic and not pale.   Psychiatric: He has a normal mood and affect. His speech is normal and behavior is normal. Judgment and thought content normal. Cognition and memory are normal.   Nursing note and vitals reviewed.        Assessment:       1. Bacterial sinusitis        Plan:       Abx, fluis, rest, mucinex d  Labs reviewed, pertinent imaging reviewed, previous medical records, medical history, surgical history, social history, family history reviewed.      Bacterial sinusitis  -     amoxicillin-clavulanate 875-125mg (AUGMENTIN) 875-125 mg per tablet; Take 1 tablet by mouth 2 (two) times daily.  for 7 days  Dispense: 14 tablet; Refill: 0         Patient Instructions     Antibiotics  Fluids  Rest  mucinex D  Return for fevers or worsening    Please drink plenty of fluids.  Please get plenty of rest.  Please return here or go to the Emergency Department for any concerns or worsening of condition.  If you were given wait & see antibiotics, please wait 3-5 days before taking them, and only take them if your symptoms have worsened or not improved.  If you do begin taking the antibiotics, please take them to completion.  If you were prescribed antibiotics, please take them to completion.  If you were prescribed a narcotic medication, do not drive or operate heavy equipment or machinery while taking these medications.  If you do not have Hypertension or any history of palpitations, it is ok to take over the counter Sudafed or Mucinex D or Allegra-D or Claritin-D or Zyrtec-D.  If you do take one of the above, it is ok to combine that with plain over the counter Mucinex or Allegra or Claritin or Zyrtec.  If for example you are taking Zyrtec -D, you can combine that with Mucinex, but not Mucinex-D.  If you are taking Mucinex-D, you can combine that with plain Allegra or Claritin or Zyrtec.   If you do have Hypertension or palpitations, it is safe to take Coricidin HBP for relief of sinus symptoms.  We recommend you take over the counter Flonase (Fluticasone) or another nasally inhaled steroid unless you are already taking one.  Nasal irrigation with a saline spray or Netti Pot like device per their directions is also recommended.  If not allergic, please take over the counter Tylenol (Acetaminophen) and/or Motrin (Ibuprofen) as directed for control of pain and/or fever.  Please follow up with your primary care doctor or specialist as needed.    If you  smoke, please stop smoking.    Acute Bacterial Rhinosinusitis (ABRS)    Acute bacterial rhinosinusitis (ABRS) is an infection of your nasal cavity and sinuses. Its  caused by bacteria. Acute means that youve had symptoms for less than 12 weeks.  Understanding your sinuses  The nasal cavity is the large air-filled space behind your nose. The sinuses are a group of spaces formed by the bones of your face. They connect with your nasal cavity. ABRS causes the tissue lining these spaces to become inflamed. Mucus may not drain normally. This leads to facial pain and other symptoms.  What causes ABRS?  ABRS most often follows an upper respiratory infection caused by a virus. Bacteria then infect the lining of your nasal cavity and sinuses. But you can also get ABRS if you have:  · Nasal allergies  · Long-term nasal swelling and congestion not caused by allergies  · Blockage in the nose  Symptoms of ABRS  The symptoms of ABRS may be different for each person, and can include:  · Nasal congestion  · Runny nose  · Fluid draining from the nose down the throat (postnasal drip)  · Headache  · Cough  · Pain in the sinuses  · Thick, colored fluid from the nose (mucus)  · Fever  Diagnosing ABRS  ABRS may be diagnosed if youve had an upper respiratory infection like a cold and cough for longer than 10 to 14 days. Your health care provider will ask about your symptoms and your medical history. The provider will check your vital signs, including your temperature. Youll have a physical exam. The health care provider will check your ears, nose, and throat. You likely wont need any tests. If ABRS comes back, you may have a culture or other tests.  Treatment for ABRS  Treatment may include:  · Antibiotic medicine. This is for symptoms that last for at least 10 to 14 days.  · Nasal corticosteroid medicine. Drops or spray used in the nose can lessen swelling and congestion.  · Over-the-counter pain medicine. This is to lessen sinus pain and pressure.  · Nasal decongestant medicine. Spray or drops may help to lessen congestion. Do not use them for more than a few days.  · Salt wash (saline  irrigation). This can help to loosen mucus.  Possible complications of ABRS  ABRS may come back or become long-term (chronic).  In rare cases, ABRS may cause complications such as:   · Inflamed tissue around the brain and spinal cord (meningitis)  · Inflamed tissue around the eyes (orbital cellulitis)  · Inflamed bones around the sinuses (osteitis)  These problems may need to be treated in a hospital with intravenous (IV) antibiotic medicine or surgery.  When to call the health care provider  Call your health care provider if you have any of the following:  · Symptoms that dont get better, or get worse  · Symptoms that dont get better after 3 to 5 days on antibiotics  · Trouble seeing  · Swelling around your eyes  · Confusion or trouble staying awake   Date Last Reviewed: 3/3/2015  © 1340-6312 [employer]. [address] [place] [address], [place] [address]. All rights reserved. This information is not intended as a substitute for professional medical care. Always follow your healthcare professional's instructions.

## 2020-03-02 NOTE — PATIENT INSTRUCTIONS
Antibiotics  Fluids  Rest  mucinex D  Return for fevers or worsening    Please drink plenty of fluids.  Please get plenty of rest.  Please return here or go to the Emergency Department for any concerns or worsening of condition.  If you were given wait & see antibiotics, please wait 3-5 days before taking them, and only take them if your symptoms have worsened or not improved.  If you do begin taking the antibiotics, please take them to completion.  If you were prescribed antibiotics, please take them to completion.  If you were prescribed a narcotic medication, do not drive or operate heavy equipment or machinery while taking these medications.  If you do not have Hypertension or any history of palpitations, it is ok to take over the counter Sudafed or Mucinex D or Allegra-D or Claritin-D or Zyrtec-D.  If you do take one of the above, it is ok to combine that with plain over the counter Mucinex or Allegra or Claritin or Zyrtec.  If for example you are taking Zyrtec -D, you can combine that with Mucinex, but not Mucinex-D.  If you are taking Mucinex-D, you can combine that with plain Allegra or Claritin or Zyrtec.   If you do have Hypertension or palpitations, it is safe to take Coricidin HBP for relief of sinus symptoms.  We recommend you take over the counter Flonase (Fluticasone) or another nasally inhaled steroid unless you are already taking one.  Nasal irrigation with a saline spray or Netti Pot like device per their directions is also recommended.  If not allergic, please take over the counter Tylenol (Acetaminophen) and/or Motrin (Ibuprofen) as directed for control of pain and/or fever.  Please follow up with your primary care doctor or specialist as needed.    If you  smoke, please stop smoking.    Acute Bacterial Rhinosinusitis (ABRS)    Acute bacterial rhinosinusitis (ABRS) is an infection of your nasal cavity and sinuses. Its caused by bacteria. Acute means that youve had symptoms for less than 12  weeks.  Understanding your sinuses  The nasal cavity is the large air-filled space behind your nose. The sinuses are a group of spaces formed by the bones of your face. They connect with your nasal cavity. ABRS causes the tissue lining these spaces to become inflamed. Mucus may not drain normally. This leads to facial pain and other symptoms.  What causes ABRS?  ABRS most often follows an upper respiratory infection caused by a virus. Bacteria then infect the lining of your nasal cavity and sinuses. But you can also get ABRS if you have:  · Nasal allergies  · Long-term nasal swelling and congestion not caused by allergies  · Blockage in the nose  Symptoms of ABRS  The symptoms of ABRS may be different for each person, and can include:  · Nasal congestion  · Runny nose  · Fluid draining from the nose down the throat (postnasal drip)  · Headache  · Cough  · Pain in the sinuses  · Thick, colored fluid from the nose (mucus)  · Fever  Diagnosing ABRS  ABRS may be diagnosed if youve had an upper respiratory infection like a cold and cough for longer than 10 to 14 days. Your health care provider will ask about your symptoms and your medical history. The provider will check your vital signs, including your temperature. Youll have a physical exam. The health care provider will check your ears, nose, and throat. You likely wont need any tests. If ABRS comes back, you may have a culture or other tests.  Treatment for ABRS  Treatment may include:  · Antibiotic medicine. This is for symptoms that last for at least 10 to 14 days.  · Nasal corticosteroid medicine. Drops or spray used in the nose can lessen swelling and congestion.  · Over-the-counter pain medicine. This is to lessen sinus pain and pressure.  · Nasal decongestant medicine. Spray or drops may help to lessen congestion. Do not use them for more than a few days.  · Salt wash (saline irrigation). This can help to loosen mucus.  Possible complications of ABRS  ABRS  may come back or become long-term (chronic).  In rare cases, ABRS may cause complications such as:   · Inflamed tissue around the brain and spinal cord (meningitis)  · Inflamed tissue around the eyes (orbital cellulitis)  · Inflamed bones around the sinuses (osteitis)  These problems may need to be treated in a hospital with intravenous (IV) antibiotic medicine or surgery.  When to call the health care provider  Call your health care provider if you have any of the following:  · Symptoms that dont get better, or get worse  · Symptoms that dont get better after 3 to 5 days on antibiotics  · Trouble seeing  · Swelling around your eyes  · Confusion or trouble staying awake   Date Last Reviewed: 3/3/2015  © 2267-7572 [employer]. [address] [place] [address], [place] [address]. All rights reserved. This information is not intended as a substitute for professional medical care. Always follow your healthcare professional's instructions.

## 2020-03-02 NOTE — LETTER
March 2, 2020      [facility] [address] [facility] [place] [address]  Phone: [phone]  Fax: [phone]       Patient: Shady Ferreira   YOB: 1980  Date of Visit: 03/02/2020    To Whom It May Concern:    Shady Ferreira  was at [facility] on 03/02/2020. He may return to work/school on 3/3 with no restrictions. If you have any questions or concerns, or if I can be of further assistance, please do not hesitate to contact me.    Sincerely,    Joya Garcias PA-C

## 2020-08-14 DIAGNOSIS — Z11.59 NEED FOR HEPATITIS C SCREENING TEST: ICD-10-CM

## 2020-11-10 ENCOUNTER — TELEPHONE (OUTPATIENT)
Dept: FAMILY MEDICINE | Facility: CLINIC | Age: 40
End: 2020-11-10

## 2020-11-12 ENCOUNTER — NURSE TRIAGE (OUTPATIENT)
Dept: ADMINISTRATIVE | Facility: CLINIC | Age: 40
End: 2020-11-12

## 2020-11-12 ENCOUNTER — OFFICE VISIT (OUTPATIENT)
Dept: FAMILY MEDICINE | Facility: CLINIC | Age: 40
End: 2020-11-12
Payer: COMMERCIAL

## 2020-11-12 VITALS
OXYGEN SATURATION: 97 % | HEART RATE: 78 BPM | SYSTOLIC BLOOD PRESSURE: 106 MMHG | BODY MASS INDEX: 25.81 KG/M2 | DIASTOLIC BLOOD PRESSURE: 80 MMHG | WEIGHT: 170.31 LBS | TEMPERATURE: 98 F | HEIGHT: 68 IN

## 2020-11-12 DIAGNOSIS — R07.9 CHEST PAIN, UNSPECIFIED TYPE: Primary | ICD-10-CM

## 2020-11-12 DIAGNOSIS — F17.200 TOBACCO DEPENDENCE: ICD-10-CM

## 2020-11-12 DIAGNOSIS — Z23 NEED FOR IMMUNIZATION AGAINST INFLUENZA: ICD-10-CM

## 2020-11-12 PROCEDURE — 99214 PR OFFICE/OUTPT VISIT, EST, LEVL IV, 30-39 MIN: ICD-10-PCS | Mod: 25,S$GLB,, | Performed by: NURSE PRACTITIONER

## 2020-11-12 PROCEDURE — 99999 PR PBB SHADOW E&M-EST. PATIENT-LVL IV: ICD-10-PCS | Mod: PBBFAC,,, | Performed by: NURSE PRACTITIONER

## 2020-11-12 PROCEDURE — 99214 OFFICE O/P EST MOD 30 MIN: CPT | Mod: 25,S$GLB,, | Performed by: NURSE PRACTITIONER

## 2020-11-12 PROCEDURE — 90471 FLU VACCINE (QUAD) GREATER THAN OR EQUAL TO 3YO PRESERVATIVE FREE IM: ICD-10-PCS | Mod: S$GLB,,, | Performed by: NURSE PRACTITIONER

## 2020-11-12 PROCEDURE — 1126F PR PAIN SEVERITY QUANTIFIED, NO PAIN PRESENT: ICD-10-PCS | Mod: S$GLB,,, | Performed by: NURSE PRACTITIONER

## 2020-11-12 PROCEDURE — 90471 IMMUNIZATION ADMIN: CPT | Mod: S$GLB,,, | Performed by: NURSE PRACTITIONER

## 2020-11-12 PROCEDURE — 3008F BODY MASS INDEX DOCD: CPT | Mod: CPTII,S$GLB,, | Performed by: NURSE PRACTITIONER

## 2020-11-12 PROCEDURE — 90686 FLU VACCINE (QUAD) GREATER THAN OR EQUAL TO 3YO PRESERVATIVE FREE IM: ICD-10-PCS | Mod: S$GLB,,, | Performed by: NURSE PRACTITIONER

## 2020-11-12 PROCEDURE — 90686 IIV4 VACC NO PRSV 0.5 ML IM: CPT | Mod: S$GLB,,, | Performed by: NURSE PRACTITIONER

## 2020-11-12 PROCEDURE — 93010 ELECTROCARDIOGRAM REPORT: CPT | Mod: S$GLB,,, | Performed by: INTERNAL MEDICINE

## 2020-11-12 PROCEDURE — 3008F PR BODY MASS INDEX (BMI) DOCUMENTED: ICD-10-PCS | Mod: CPTII,S$GLB,, | Performed by: NURSE PRACTITIONER

## 2020-11-12 PROCEDURE — 93005 EKG 12-LEAD: ICD-10-PCS | Mod: S$GLB,,, | Performed by: NURSE PRACTITIONER

## 2020-11-12 PROCEDURE — 1126F AMNT PAIN NOTED NONE PRSNT: CPT | Mod: S$GLB,,, | Performed by: NURSE PRACTITIONER

## 2020-11-12 PROCEDURE — 93010 EKG 12-LEAD: ICD-10-PCS | Mod: S$GLB,,, | Performed by: INTERNAL MEDICINE

## 2020-11-12 PROCEDURE — 93005 ELECTROCARDIOGRAM TRACING: CPT | Mod: S$GLB,,, | Performed by: NURSE PRACTITIONER

## 2020-11-12 PROCEDURE — 99999 PR PBB SHADOW E&M-EST. PATIENT-LVL IV: CPT | Mod: PBBFAC,,, | Performed by: NURSE PRACTITIONER

## 2020-11-12 RX ORDER — FLUTICASONE PROPIONATE 50 MCG
1 SPRAY, SUSPENSION (ML) NASAL DAILY
COMMUNITY
Start: 2020-11-12

## 2020-11-12 RX ORDER — BENZONATATE 100 MG/1
100 CAPSULE ORAL 3 TIMES DAILY PRN
COMMUNITY
End: 2022-06-03

## 2020-11-12 RX ORDER — BUTALBITAL, ACETAMINOPHEN AND CAFFEINE 300; 40; 50 MG/1; MG/1; MG/1
1 CAPSULE ORAL DAILY
COMMUNITY
Start: 2020-11-12

## 2020-11-12 NOTE — PROGRESS NOTES
Subjective:       Patient ID: Shady Ferreira is a 39 y.o. male.    Chief Complaint: Chest Pain (2 weeks)    39-year-old male presents to the clinic today with complaint of midsternal chest pain described as stabbing sensation radiating up his throat into the left side of his neck at times off and on for the past 2 weeks.  He said the worst it was was on Saturday when he was just resting in his house that lasted for several hours.  He does feel short of breath at times.  However, he does smoke but is currently trying to quit.  He denies any diaphoresis, nausea, or vomiting.  He has diarrhea sometimes on the weekend after eating seafood.  He has a paternal grandfather that  of heart disease at age 69.  His no other known coronary artery disease in his family.  He has borderline hyperlipidemia.  He is currently not on any medication for it.  He also has been having some problems with hoarseness the last couple months.  I recommended that he quit smoking and after he quit smoking if he still having hoarseness he needs to follow-up with ENT for further evaluation.  I will schedule his physical with Dr. Plata in January.  I told him he was still having hoarseness to be sure and discuss that with Dr. Plata in January.    Past Medical History:   Diagnosis Date    Calcaneus fracture, right     Herpes genitalia      Past Surgical History:   Procedure Laterality Date    FINGER SURGERY Left     index tendon repair      reports that he has been smoking cigarettes. He started smoking about 34 years ago. He has a 24.00 pack-year smoking history. He has never used smokeless tobacco. He reports current alcohol use. He reports that he does not use drugs.  Review of Systems   Constitutional: Negative for chills and fever.   HENT: Positive for voice change.         Hoarseness    Respiratory: Positive for shortness of breath. Negative for cough and wheezing.    Cardiovascular: Positive for chest pain. Negative for  palpitations and leg swelling.   Gastrointestinal: Positive for diarrhea. Negative for abdominal pain, constipation, nausea and vomiting.   Musculoskeletal: Negative for gait problem.   Skin:        Not diaphoresis    Neurological: Negative for dizziness, light-headedness and headaches.       Objective:      Physical Exam  Vitals signs reviewed.   Constitutional:       General: He is not in acute distress.     Appearance: Normal appearance. He is not ill-appearing, toxic-appearing or diaphoretic.      Comments: Mild hoarseness noted    Neck:      Musculoskeletal: Normal range of motion and neck supple.      Vascular: No carotid bruit.   Cardiovascular:      Rate and Rhythm: Normal rate and regular rhythm.      Pulses: Normal pulses.      Heart sounds: Normal heart sounds. No murmur.   Pulmonary:      Effort: Pulmonary effort is normal.      Breath sounds: Normal breath sounds. No wheezing or rhonchi.   Abdominal:      General: Bowel sounds are normal.      Palpations: Abdomen is soft.      Tenderness: There is no abdominal tenderness.   Musculoskeletal: Normal range of motion.         General: No swelling.   Skin:     General: Skin is warm and dry.      Comments: No diaphoresis    Neurological:      Mental Status: He is alert and oriented to person, place, and time.   Psychiatric:         Mood and Affect: Mood normal.         Behavior: Behavior normal.         Thought Content: Thought content normal.         Judgment: Judgment normal.         Assessment:       1. Chest pain, unspecified type    2. Need for immunization against influenza    3. Tobacco dependence        Plan:         Chest pain, unspecified type  -     EKG 12-lead  -     Ambulatory referral/consult to Cardiology; Future; Expected date: 11/19/2020  - EKG normal sinus rhythm with sinus arrhythmia    Need for immunization against influenza  -     Influenza - Quadrivalent (PF)    Tobacco dependence  - need to quit smoking       - If stabbing chest pain  with sob occurs tonight go to the ER for further evaluation   - if not be sure to see Dr. Torres tomorrow morning at 8:30 am here at the [facility]

## 2020-11-13 ENCOUNTER — OFFICE VISIT (OUTPATIENT)
Dept: CARDIOLOGY | Facility: CLINIC | Age: 40
End: 2020-11-13
Payer: COMMERCIAL

## 2020-11-13 VITALS
BODY MASS INDEX: 25.31 KG/M2 | OXYGEN SATURATION: 98 % | SYSTOLIC BLOOD PRESSURE: 124 MMHG | DIASTOLIC BLOOD PRESSURE: 76 MMHG | WEIGHT: 167 LBS | HEART RATE: 75 BPM | HEIGHT: 68 IN

## 2020-11-13 DIAGNOSIS — R07.89 CHEST PAIN, ATYPICAL: ICD-10-CM

## 2020-11-13 DIAGNOSIS — E78.2 MIXED HYPERLIPIDEMIA: Primary | ICD-10-CM

## 2020-11-13 DIAGNOSIS — R07.9 CHEST PAIN, UNSPECIFIED TYPE: ICD-10-CM

## 2020-11-13 DIAGNOSIS — R06.02 SOB (SHORTNESS OF BREATH): ICD-10-CM

## 2020-11-13 PROCEDURE — 93000 EKG 12-LEAD: ICD-10-PCS | Mod: S$GLB,,, | Performed by: INTERNAL MEDICINE

## 2020-11-13 PROCEDURE — 3008F PR BODY MASS INDEX (BMI) DOCUMENTED: ICD-10-PCS | Mod: CPTII,S$GLB,, | Performed by: INTERNAL MEDICINE

## 2020-11-13 PROCEDURE — 99204 OFFICE O/P NEW MOD 45 MIN: CPT | Mod: S$GLB,,, | Performed by: INTERNAL MEDICINE

## 2020-11-13 PROCEDURE — 1126F PR PAIN SEVERITY QUANTIFIED, NO PAIN PRESENT: ICD-10-PCS | Mod: S$GLB,,, | Performed by: INTERNAL MEDICINE

## 2020-11-13 PROCEDURE — 93000 ELECTROCARDIOGRAM COMPLETE: CPT | Mod: S$GLB,,, | Performed by: INTERNAL MEDICINE

## 2020-11-13 PROCEDURE — 99204 PR OFFICE/OUTPT VISIT, NEW, LEVL IV, 45-59 MIN: ICD-10-PCS | Mod: S$GLB,,, | Performed by: INTERNAL MEDICINE

## 2020-11-13 PROCEDURE — 1126F AMNT PAIN NOTED NONE PRSNT: CPT | Mod: S$GLB,,, | Performed by: INTERNAL MEDICINE

## 2020-11-13 PROCEDURE — 99999 PR PBB SHADOW E&M-EST. PATIENT-LVL IV: CPT | Mod: PBBFAC,,, | Performed by: INTERNAL MEDICINE

## 2020-11-13 PROCEDURE — 3008F BODY MASS INDEX DOCD: CPT | Mod: CPTII,S$GLB,, | Performed by: INTERNAL MEDICINE

## 2020-11-13 PROCEDURE — 99999 PR PBB SHADOW E&M-EST. PATIENT-LVL IV: ICD-10-PCS | Mod: PBBFAC,,, | Performed by: INTERNAL MEDICINE

## 2020-11-13 NOTE — LETTER
November 13, 2020      Aron Arreola, FNP-C  [address] [facility]  [place] [address]           [facility]  [address]  [place] [address]  Phone: [phone]          Patient: Shady Ferreira   MR Number: [id number]   YOB: 1980   Date of Visit: 11/13/2020       Dear Aron Arreola:    Thank you for referring Shady Ferreira to me for evaluation. Attached you will find relevant portions of my assessment and plan of care.    If you have questions, please do not hesitate to call me. I look forward to following Shady Ferreira along with you.    Sincerely,    Silvio Torres MD    Enclosure  CC:  No Recipients    If you would like to receive this communication electronically, please contact [email or web address] or [phone] to request more information on [employer] access.    For providers and/or their staff who would like to refer a patient to [facility], please contact us through our one-stop-shop provider referral line, [facility], at [phone].    If you feel you have received this communication in error or would no longer like to receive these types of communications, please e-mail [email or web address]

## 2020-11-13 NOTE — PROGRESS NOTES
Subjective:    Patient ID:  Shady Ferreira is a 39 y.o. male who presents for evaluation of Chest Pain      HPI     Referred by NP  39-year-old male presents to the clinic today with complaint of midsternal chest pain described as stabbing sensation radiating up his throat into the left side of his neck at times off and on for the past 2 weeks.  He said the worst it was was on Saturday when he was just resting in his house that lasted for several hours.  He does feel short of breath at times.  However, he does smoke but is currently trying to quit.  He denies any diaphoresis, nausea, or vomiting.  He has diarrhea sometimes on the weekend after eating seafood.  He has a paternal grandfather that  of heart disease at age 69.  His no other known coronary artery disease in his family.  He has borderline hyperlipidemia.  He is currently not on any medication for it.  He also has been having some problems with hoarseness the last couple months.  I recommended that he quit smoking and after he quit smoking if he still having hoarseness he needs to follow-up with ENT for further evaluation.  I will schedule his physical with Dr. Plata in January.  I told him he was still having hoarseness to be sure and discuss that with Dr. Plata in January.    EKG NSR - ok 20 and 20 Still with CP at rest  Strong family Hx CAD  Smokes 1 PPD      Review of Systems   Constitution: Negative for decreased appetite.   HENT: Negative for ear discharge.    Eyes: Negative for blurred vision.   Endocrine: Negative for polyphagia.   Skin: Negative for nail changes.   Genitourinary: Negative for bladder incontinence.   Neurological: Negative for aphonia.   Psychiatric/Behavioral: Negative for hallucinations.   Allergic/Immunologic: Negative for hives.        Objective:    Physical Exam   Constitutional: He is oriented to person, place, and time. He appears well-developed and well-nourished.   HENT:   Head:  Normocephalic and atraumatic.   Eyes: Pupils are equal, round, and reactive to light. Conjunctivae are normal.   Neck: Normal range of motion. Neck supple.   Cardiovascular: Normal rate, normal heart sounds and intact distal pulses.   Pulmonary/Chest: Effort normal and breath sounds normal.   Abdominal: Soft. Bowel sounds are normal.   Musculoskeletal: Normal range of motion.   Neurological: He is alert and oriented to person, place, and time.   Skin: Skin is warm and dry.         Assessment:       1. Mixed hyperlipidemia    2. Chest pain, unspecified type    3. Chest pain, atypical         Plan:       Echo and treadmill stress test for CP

## 2020-11-18 ENCOUNTER — HOSPITAL ENCOUNTER (OUTPATIENT)
Dept: CARDIOLOGY | Facility: HOSPITAL | Age: 40
Discharge: HOME | End: 2020-11-18
Attending: INTERNAL MEDICINE
Payer: COMMERCIAL

## 2020-11-18 LAB
ASCENDING AORTA: 2.7 CM
AV INDEX (PROSTH): 0.98
AV MEAN GRADIENT: 5 MMHG
AV PEAK GRADIENT: 8 MMHG
AV VALVE AREA: 2.8 CM2
AV VELOCITY RATIO: 0.95
CV ECHO LV RWT: 0.38 CM
CV STRESS BASE HR: 74 BPM
DIASTOLIC BLOOD PRESSURE: 84 MMHG
DOP CALC AO PEAK VEL: 1.42 M/S
DOP CALC AO VTI: 27.3 CM
DOP CALC LVOT AREA: 2.9 CM2
DOP CALC LVOT DIAMETER: 1.91 CM
DOP CALC LVOT PEAK VEL: 1.35 M/S
DOP CALC LVOT STROKE VOLUME: 76.46 CM3
DOP CALCLVOT PEAK VEL VTI: 26.7 CM
E WAVE DECELERATION TIME: 184.75 MSEC
E/A RATIO: 1.35
ECHO LV POSTERIOR WALL: 0.89 CM (ref 0.6–1.1)
FRACTIONAL SHORTENING: 34 % (ref 28–44)
INTERVENTRICULAR SEPTUM: 0.95 CM (ref 0.6–1.1)
IVRT: 78.43 MSEC
LA MAJOR: 5.33 CM
LA MINOR: 5.34 CM
LA WIDTH: 3.78 CM
LEFT ATRIUM SIZE: 3.5 CM
LEFT ATRIUM VOLUME: 59.99 CM3
LEFT INTERNAL DIMENSION IN SYSTOLE: 3.11 CM (ref 2.1–4)
LEFT VENTRICLE DIASTOLIC VOLUME: 103.73 ML
LEFT VENTRICLE SYSTOLIC VOLUME: 38.24 ML
LEFT VENTRICULAR INTERNAL DIMENSION IN DIASTOLE: 4.73 CM (ref 3.5–6)
LEFT VENTRICULAR MASS: 148.5 G
MV PEAK A VEL: 0.63 M/S
MV PEAK E VEL: 0.85 M/S
MV STENOSIS PRESSURE HALF TIME: 53.58 MS
MV VALVE AREA P 1/2 METHOD: 4.11 CM2
OHS CV CPX 1 MINUTE RECOVERY HEART RATE: 129 BPM
OHS CV CPX 85 PERCENT MAX PREDICTED HEART RATE MALE: 153
OHS CV CPX ESTIMATED METS: 13.4
OHS CV CPX MAX PREDICTED HEART RATE: 180
OHS CV CPX PATIENT IS FEMALE: 0
OHS CV CPX PATIENT IS MALE: 1
OHS CV CPX PEAK DIASTOLIC BLOOD PRESSURE: 59 MMHG
OHS CV CPX PEAK HEAR RATE: 162 BPM
OHS CV CPX PEAK RATE PRESSURE PRODUCT: NORMAL
OHS CV CPX PEAK SYSTOLIC BLOOD PRESSURE: 194 MMHG
OHS CV CPX PERCENT MAX PREDICTED HEART RATE ACHIEVED: 90
OHS CV CPX RATE PRESSURE PRODUCT PRESENTING: 9102
PISA TR MAX VEL: 1.14 M/S
PV PEAK VELOCITY: 0.92 CM/S
RA MAJOR: 5.08 CM
RA PRESSURE: 3 MMHG
RA WIDTH: 3.66 CM
RIGHT VENTRICULAR END-DIASTOLIC DIMENSION: 3.17 CM
RV TISSUE DOPPLER FREE WALL SYSTOLIC VELOCITY 1 (APICAL 4 CHAMBER VIEW): 11.39 CM/S
SINUS: 3.01 CM
STJ: 2.54 CM
STRESS ECHO POST EXERCISE DUR MIN: 11 MINUTES
STRESS ECHO POST EXERCISE DUR SEC: 9 SECONDS
SYSTOLIC BLOOD PRESSURE: 123 MMHG
TR MAX PG: 5 MMHG
TRICUSPID ANNULAR PLANE SYSTOLIC EXCURSION: 1.97 CM
TV REST PULMONARY ARTERY PRESSURE: 8 MMHG

## 2020-11-18 PROCEDURE — 93018 EXERCISE STRESS - EKG (CUPID ONLY): ICD-10-PCS | Mod: ,,, | Performed by: INTERNAL MEDICINE

## 2020-11-18 PROCEDURE — 93306 TTE W/DOPPLER COMPLETE: CPT | Mod: 26,,, | Performed by: INTERNAL MEDICINE

## 2020-11-18 PROCEDURE — 93306 TTE W/DOPPLER COMPLETE: CPT

## 2020-11-18 PROCEDURE — 93016 EXERCISE STRESS - EKG (CUPID ONLY): ICD-10-PCS | Mod: ,,, | Performed by: INTERNAL MEDICINE

## 2020-11-18 PROCEDURE — 93017 CV STRESS TEST TRACING ONLY: CPT

## 2020-11-18 PROCEDURE — 93306 ECHO (CUPID ONLY): ICD-10-PCS | Mod: 26,,, | Performed by: INTERNAL MEDICINE

## 2020-11-18 PROCEDURE — 93018 CV STRESS TEST I&R ONLY: CPT | Mod: ,,, | Performed by: INTERNAL MEDICINE

## 2020-11-18 PROCEDURE — 93016 CV STRESS TEST SUPVJ ONLY: CPT | Mod: ,,, | Performed by: INTERNAL MEDICINE

## 2020-12-10 ENCOUNTER — OFFICE VISIT (OUTPATIENT)
Dept: CARDIOLOGY | Facility: CLINIC | Age: 40
End: 2020-12-10
Payer: COMMERCIAL

## 2020-12-10 VITALS
HEIGHT: 68 IN | OXYGEN SATURATION: 98 % | DIASTOLIC BLOOD PRESSURE: 72 MMHG | SYSTOLIC BLOOD PRESSURE: 115 MMHG | WEIGHT: 168.88 LBS | HEART RATE: 79 BPM | BODY MASS INDEX: 25.59 KG/M2

## 2020-12-10 DIAGNOSIS — F17.200 TOBACCO DEPENDENCE: Chronic | ICD-10-CM

## 2020-12-10 DIAGNOSIS — E78.2 MIXED HYPERLIPIDEMIA: Primary | ICD-10-CM

## 2020-12-10 DIAGNOSIS — R07.89 CHEST PAIN, ATYPICAL: ICD-10-CM

## 2020-12-10 DIAGNOSIS — J32.9 SINUSITIS, UNSPECIFIED CHRONICITY, UNSPECIFIED LOCATION: ICD-10-CM

## 2020-12-10 PROCEDURE — 99213 PR OFFICE/OUTPT VISIT, EST, LEVL III, 20-29 MIN: ICD-10-PCS | Mod: S$GLB,,, | Performed by: INTERNAL MEDICINE

## 2020-12-10 PROCEDURE — 1126F AMNT PAIN NOTED NONE PRSNT: CPT | Mod: S$GLB,,, | Performed by: INTERNAL MEDICINE

## 2020-12-10 PROCEDURE — 1126F PR PAIN SEVERITY QUANTIFIED, NO PAIN PRESENT: ICD-10-PCS | Mod: S$GLB,,, | Performed by: INTERNAL MEDICINE

## 2020-12-10 PROCEDURE — 3008F BODY MASS INDEX DOCD: CPT | Mod: CPTII,S$GLB,, | Performed by: INTERNAL MEDICINE

## 2020-12-10 PROCEDURE — 3008F PR BODY MASS INDEX (BMI) DOCUMENTED: ICD-10-PCS | Mod: CPTII,S$GLB,, | Performed by: INTERNAL MEDICINE

## 2020-12-10 PROCEDURE — 99999 PR PBB SHADOW E&M-EST. PATIENT-LVL IV: ICD-10-PCS | Mod: PBBFAC,,, | Performed by: INTERNAL MEDICINE

## 2020-12-10 PROCEDURE — 99213 OFFICE O/P EST LOW 20 MIN: CPT | Mod: S$GLB,,, | Performed by: INTERNAL MEDICINE

## 2020-12-10 PROCEDURE — 99999 PR PBB SHADOW E&M-EST. PATIENT-LVL IV: CPT | Mod: PBBFAC,,, | Performed by: INTERNAL MEDICINE

## 2020-12-10 NOTE — PROGRESS NOTES
Subjective:    Patient ID:  Shady Ferreira is a 39 y.o. male who presents for follow-up of Hyperlipidemia      HPI     Referred by NP  39-year-old male presents to the clinic today with complaint of midsternal chest pain described as stabbing sensation radiating up his throat into the left side of his neck at times off and on for the past 2 weeks.  He said the worst it was was on Saturday when he was just resting in his house that lasted for several hours.  He does feel short of breath at times.  However, he does smoke but is currently trying to quit.  He denies any diaphoresis, nausea, or vomiting.  He has diarrhea sometimes on the weekend after eating seafood.  He has a paternal grandfather that  of heart disease at age 69.  His no other known coronary artery disease in his family.  He has borderline hyperlipidemia.  He is currently not on any medication for it.  He also has been having some problems with hoarseness the last couple months.  I recommended that he quit smoking and after he quit smoking if he still having hoarseness he needs to follow-up with ENT for further evaluation.  I will schedule his physical with Dr. Plata in January.  I told him he was still having hoarseness to be sure and discuss that with Dr. Plata in January.     EKG NSR - ok 20 and 20    Stress test 20    The EKG portion of this study is abnormal but not diagnostic.    The patient reported no chest pain during the stress test.    The exercise capacity was normal.  Images reviewed - 1mm upsloping ST depression noted at peak exercise    Echo 20  · The left ventricle is normal in size with normal systolic function. The estimated ejection fraction is 55%.  · Normal left ventricular diastolic function.  · Normal right ventricular size with normal right ventricular systolic function.  · Mild left atrial enlargement.  · Normal central venous pressure (3 mmHg).  · The estimated PA systolic pressure is 8  mmHg.     11/13/20 Still with CP at rest  Strong family Hx CAD  Smokes 1 PPD  Echo and treadmill stress test for CP    12/10/20 Denies further CP. Had SOB on the stress test but no CP  Requesting an ENT referral    Review of Systems   Constitution: Negative for decreased appetite.   HENT: Negative for ear discharge.    Eyes: Negative for blurred vision.   Endocrine: Negative for polyphagia.   Skin: Negative for nail changes.   Genitourinary: Negative for bladder incontinence.   Neurological: Negative for aphonia.   Psychiatric/Behavioral: Negative for hallucinations.   Allergic/Immunologic: Negative for hives.        Objective:    Physical Exam   Constitutional: He is oriented to person, place, and time. He appears well-developed and well-nourished.   HENT:   Head: Normocephalic and atraumatic.   Eyes: Pupils are equal, round, and reactive to light. Conjunctivae are normal.   Neck: Normal range of motion. Neck supple.   Cardiovascular: Normal rate, normal heart sounds and intact distal pulses.   Pulmonary/Chest: Effort normal and breath sounds normal.   Abdominal: Soft. Bowel sounds are normal.   Musculoskeletal: Normal range of motion.   Neurological: He is alert and oriented to person, place, and time.   Skin: Skin is warm and dry.         Assessment:       1. Mixed hyperlipidemia    2. Tobacco dependence    3. Chest pain, atypical         Plan:       CP resolved - borderline EKG change on stress test - will observe for now - if CP recurs consider LHC or stress test with imaging  OV 1 year

## 2020-12-29 ENCOUNTER — TELEPHONE (OUTPATIENT)
Dept: FAMILY MEDICINE | Facility: CLINIC | Age: 40
End: 2020-12-29

## 2021-01-21 ENCOUNTER — OFFICE VISIT (OUTPATIENT)
Dept: FAMILY MEDICINE | Facility: CLINIC | Age: 41
End: 2021-01-21
Payer: COMMERCIAL

## 2021-01-21 ENCOUNTER — LAB VISIT (OUTPATIENT)
Dept: LAB | Facility: HOSPITAL | Age: 41
End: 2021-01-21
Attending: INTERNAL MEDICINE
Payer: COMMERCIAL

## 2021-01-21 VITALS
WEIGHT: 169.06 LBS | BODY MASS INDEX: 25.62 KG/M2 | DIASTOLIC BLOOD PRESSURE: 80 MMHG | HEIGHT: 68 IN | TEMPERATURE: 98 F | HEART RATE: 87 BPM | OXYGEN SATURATION: 98 % | SYSTOLIC BLOOD PRESSURE: 120 MMHG

## 2021-01-21 DIAGNOSIS — F43.29 ADJUSTMENT DISORDER WITH OTHER SYMPTOM: ICD-10-CM

## 2021-01-21 DIAGNOSIS — Z11.4 SCREENING FOR HIV (HUMAN IMMUNODEFICIENCY VIRUS): ICD-10-CM

## 2021-01-21 DIAGNOSIS — Z11.59 NEED FOR HEPATITIS C SCREENING TEST: ICD-10-CM

## 2021-01-21 DIAGNOSIS — Z00.00 ROUTINE MEDICAL EXAM: ICD-10-CM

## 2021-01-21 DIAGNOSIS — F17.200 TOBACCO DEPENDENCE: Chronic | ICD-10-CM

## 2021-01-21 DIAGNOSIS — Z00.00 ROUTINE MEDICAL EXAM: Primary | ICD-10-CM

## 2021-01-21 DIAGNOSIS — E78.2 MIXED HYPERLIPIDEMIA: ICD-10-CM

## 2021-01-21 DIAGNOSIS — R49.0 DYSPHONIA: ICD-10-CM

## 2021-01-21 LAB
ALBUMIN SERPL BCP-MCNC: 3.9 G/DL (ref 3.5–5.2)
ALP SERPL-CCNC: 109 U/L (ref 55–135)
ALT SERPL W/O P-5'-P-CCNC: 25 U/L (ref 10–44)
ANION GAP SERPL CALC-SCNC: 12 MMOL/L (ref 8–16)
AST SERPL-CCNC: 30 U/L (ref 10–40)
BASOPHILS # BLD AUTO: 0.11 K/UL (ref 0–0.2)
BASOPHILS NFR BLD: 1.3 % (ref 0–1.9)
BILIRUB SERPL-MCNC: 0.4 MG/DL (ref 0.1–1)
BUN SERPL-MCNC: 12 MG/DL (ref 6–20)
CALCIUM SERPL-MCNC: 9.4 MG/DL (ref 8.7–10.5)
CHLORIDE SERPL-SCNC: 103 MMOL/L (ref 95–110)
CHOLEST SERPL-MCNC: 243 MG/DL (ref 120–199)
CHOLEST/HDLC SERPL: 6.6 {RATIO} (ref 2–5)
CO2 SERPL-SCNC: 25 MMOL/L (ref 23–29)
CREAT SERPL-MCNC: 1.3 MG/DL (ref 0.5–1.4)
DIFFERENTIAL METHOD: NORMAL
EOSINOPHIL # BLD AUTO: 0.3 K/UL (ref 0–0.5)
EOSINOPHIL NFR BLD: 2.9 % (ref 0–8)
ERYTHROCYTE [DISTWIDTH] IN BLOOD BY AUTOMATED COUNT: 12.7 % (ref 11.5–14.5)
EST. GFR  (AFRICAN AMERICAN): >60 ML/MIN/1.73 M^2
EST. GFR  (NON AFRICAN AMERICAN): >60 ML/MIN/1.73 M^2
GLUCOSE SERPL-MCNC: 91 MG/DL (ref 70–110)
HCT VFR BLD AUTO: 46 % (ref 40–54)
HDLC SERPL-MCNC: 37 MG/DL (ref 40–75)
HDLC SERPL: 15.2 % (ref 20–50)
HGB BLD-MCNC: 14.8 G/DL (ref 14–18)
IMM GRANULOCYTES # BLD AUTO: 0.02 K/UL (ref 0–0.04)
IMM GRANULOCYTES NFR BLD AUTO: 0.2 % (ref 0–0.5)
LDLC SERPL CALC-MCNC: 143.8 MG/DL (ref 63–159)
LYMPHOCYTES # BLD AUTO: 3 K/UL (ref 1–4.8)
LYMPHOCYTES NFR BLD: 35.5 % (ref 18–48)
MCH RBC QN AUTO: 29.9 PG (ref 27–31)
MCHC RBC AUTO-ENTMCNC: 32.2 G/DL (ref 32–36)
MCV RBC AUTO: 93 FL (ref 82–98)
MONOCYTES # BLD AUTO: 0.9 K/UL (ref 0.3–1)
MONOCYTES NFR BLD: 10 % (ref 4–15)
NEUTROPHILS # BLD AUTO: 4.3 K/UL (ref 1.8–7.7)
NEUTROPHILS NFR BLD: 50.1 % (ref 38–73)
NONHDLC SERPL-MCNC: 206 MG/DL
NRBC BLD-RTO: 0 /100 WBC
PLATELET # BLD AUTO: 311 K/UL (ref 150–350)
PMV BLD AUTO: 11.5 FL (ref 9.2–12.9)
POTASSIUM SERPL-SCNC: 4.3 MMOL/L (ref 3.5–5.1)
PROT SERPL-MCNC: 6.9 G/DL (ref 6–8.4)
RBC # BLD AUTO: 4.95 M/UL (ref 4.6–6.2)
SODIUM SERPL-SCNC: 140 MMOL/L (ref 136–145)
TRIGL SERPL-MCNC: 311 MG/DL (ref 30–150)
WBC # BLD AUTO: 8.54 K/UL (ref 3.9–12.7)

## 2021-01-21 PROCEDURE — 85025 COMPLETE CBC W/AUTO DIFF WBC: CPT

## 2021-01-21 PROCEDURE — 80061 LIPID PANEL: CPT

## 2021-01-21 PROCEDURE — 1126F AMNT PAIN NOTED NONE PRSNT: CPT | Mod: S$GLB,,, | Performed by: INTERNAL MEDICINE

## 2021-01-21 PROCEDURE — 1126F PR PAIN SEVERITY QUANTIFIED, NO PAIN PRESENT: ICD-10-PCS | Mod: S$GLB,,, | Performed by: INTERNAL MEDICINE

## 2021-01-21 PROCEDURE — 3008F PR BODY MASS INDEX (BMI) DOCUMENTED: ICD-10-PCS | Mod: CPTII,S$GLB,, | Performed by: INTERNAL MEDICINE

## 2021-01-21 PROCEDURE — 80053 COMPREHEN METABOLIC PANEL: CPT

## 2021-01-21 PROCEDURE — 3008F BODY MASS INDEX DOCD: CPT | Mod: CPTII,S$GLB,, | Performed by: INTERNAL MEDICINE

## 2021-01-21 PROCEDURE — 99999 PR PBB SHADOW E&M-EST. PATIENT-LVL IV: CPT | Mod: PBBFAC,,, | Performed by: INTERNAL MEDICINE

## 2021-01-21 PROCEDURE — 36415 COLL VENOUS BLD VENIPUNCTURE: CPT | Mod: PO

## 2021-01-21 PROCEDURE — 86803 HEPATITIS C AB TEST: CPT

## 2021-01-21 PROCEDURE — 99396 PR PREVENTIVE VISIT,EST,40-64: ICD-10-PCS | Mod: S$GLB,,, | Performed by: INTERNAL MEDICINE

## 2021-01-21 PROCEDURE — 99999 PR PBB SHADOW E&M-EST. PATIENT-LVL IV: ICD-10-PCS | Mod: PBBFAC,,, | Performed by: INTERNAL MEDICINE

## 2021-01-21 PROCEDURE — 86703 HIV-1/HIV-2 1 RESULT ANTBDY: CPT

## 2021-01-21 PROCEDURE — 99396 PREV VISIT EST AGE 40-64: CPT | Mod: S$GLB,,, | Performed by: INTERNAL MEDICINE

## 2021-01-22 ENCOUNTER — PATIENT MESSAGE (OUTPATIENT)
Dept: FAMILY MEDICINE | Facility: CLINIC | Age: 41
End: 2021-01-22

## 2021-01-22 LAB
HCV AB SERPL QL IA: NEGATIVE
HIV 1+2 AB+HIV1 P24 AG SERPL QL IA: NEGATIVE

## 2021-01-25 ENCOUNTER — TELEPHONE (OUTPATIENT)
Dept: FAMILY MEDICINE | Facility: CLINIC | Age: 41
End: 2021-01-25

## 2021-01-25 ENCOUNTER — PATIENT MESSAGE (OUTPATIENT)
Dept: FAMILY MEDICINE | Facility: CLINIC | Age: 41
End: 2021-01-25

## 2021-01-25 DIAGNOSIS — E78.2 MIXED HYPERLIPIDEMIA: Chronic | ICD-10-CM

## 2021-01-26 ENCOUNTER — TELEPHONE (OUTPATIENT)
Dept: FAMILY MEDICINE | Facility: CLINIC | Age: 41
End: 2021-01-26

## 2021-01-26 RX ORDER — PRAVASTATIN SODIUM 20 MG/1
20 TABLET ORAL DAILY
Qty: 90 TABLET | Refills: 3 | Status: SHIPPED | OUTPATIENT
Start: 2021-01-26 | End: 2022-02-15

## 2021-04-15 ENCOUNTER — PATIENT MESSAGE (OUTPATIENT)
Dept: RESEARCH | Facility: HOSPITAL | Age: 41
End: 2021-04-15

## 2021-04-28 ENCOUNTER — PATIENT MESSAGE (OUTPATIENT)
Dept: FAMILY MEDICINE | Facility: CLINIC | Age: 41
End: 2021-04-28

## 2021-04-28 DIAGNOSIS — E78.2 MIXED HYPERLIPIDEMIA: Primary | ICD-10-CM

## 2021-04-28 DIAGNOSIS — M79.10 MYALGIA: ICD-10-CM

## 2021-04-29 ENCOUNTER — PATIENT MESSAGE (OUTPATIENT)
Dept: FAMILY MEDICINE | Facility: CLINIC | Age: 41
End: 2021-04-29

## 2021-05-13 ENCOUNTER — LAB VISIT (OUTPATIENT)
Dept: LAB | Facility: HOSPITAL | Age: 41
End: 2021-05-13
Attending: INTERNAL MEDICINE
Payer: COMMERCIAL

## 2021-05-13 DIAGNOSIS — E78.2 MIXED HYPERLIPIDEMIA: ICD-10-CM

## 2021-05-13 DIAGNOSIS — M79.10 MYALGIA: ICD-10-CM

## 2021-05-13 LAB
ALBUMIN SERPL BCP-MCNC: 3.7 G/DL (ref 3.5–5.2)
ALP SERPL-CCNC: 101 U/L (ref 55–135)
ALT SERPL W/O P-5'-P-CCNC: 20 U/L (ref 10–44)
ANION GAP SERPL CALC-SCNC: 8 MMOL/L (ref 8–16)
AST SERPL-CCNC: 21 U/L (ref 10–40)
BILIRUB SERPL-MCNC: 0.6 MG/DL (ref 0.1–1)
BUN SERPL-MCNC: 15 MG/DL (ref 6–20)
CALCIUM SERPL-MCNC: 9.5 MG/DL (ref 8.7–10.5)
CHLORIDE SERPL-SCNC: 108 MMOL/L (ref 95–110)
CHOLEST SERPL-MCNC: 229 MG/DL (ref 120–199)
CHOLEST/HDLC SERPL: 6.2 {RATIO} (ref 2–5)
CK SERPL-CCNC: 150 U/L (ref 20–200)
CO2 SERPL-SCNC: 25 MMOL/L (ref 23–29)
CREAT SERPL-MCNC: 1.3 MG/DL (ref 0.5–1.4)
EST. GFR  (AFRICAN AMERICAN): >60 ML/MIN/1.73 M^2
EST. GFR  (NON AFRICAN AMERICAN): >60 ML/MIN/1.73 M^2
GLUCOSE SERPL-MCNC: 100 MG/DL (ref 70–110)
HDLC SERPL-MCNC: 37 MG/DL (ref 40–75)
HDLC SERPL: 16.2 % (ref 20–50)
LDLC SERPL CALC-MCNC: 156 MG/DL (ref 63–159)
MAGNESIUM SERPL-MCNC: 2.1 MG/DL (ref 1.6–2.6)
NONHDLC SERPL-MCNC: 192 MG/DL
POTASSIUM SERPL-SCNC: 4.2 MMOL/L (ref 3.5–5.1)
PROT SERPL-MCNC: 6.9 G/DL (ref 6–8.4)
SODIUM SERPL-SCNC: 141 MMOL/L (ref 136–145)
TRIGL SERPL-MCNC: 180 MG/DL (ref 30–150)

## 2021-05-13 PROCEDURE — 80053 COMPREHEN METABOLIC PANEL: CPT | Performed by: INTERNAL MEDICINE

## 2021-05-13 PROCEDURE — 80061 LIPID PANEL: CPT | Performed by: INTERNAL MEDICINE

## 2021-05-13 PROCEDURE — 36415 COLL VENOUS BLD VENIPUNCTURE: CPT | Mod: PO | Performed by: INTERNAL MEDICINE

## 2021-05-13 PROCEDURE — 83735 ASSAY OF MAGNESIUM: CPT | Performed by: INTERNAL MEDICINE

## 2021-05-13 PROCEDURE — 82550 ASSAY OF CK (CPK): CPT | Performed by: INTERNAL MEDICINE

## 2021-05-31 ENCOUNTER — PATIENT MESSAGE (OUTPATIENT)
Dept: FAMILY MEDICINE | Facility: CLINIC | Age: 41
End: 2021-05-31

## 2021-08-08 ENCOUNTER — PATIENT MESSAGE (OUTPATIENT)
Dept: FAMILY MEDICINE | Facility: CLINIC | Age: 41
End: 2021-08-08

## 2021-08-09 ENCOUNTER — OFFICE VISIT (OUTPATIENT)
Dept: URGENT CARE | Facility: CLINIC | Age: 41
End: 2021-08-09
Payer: COMMERCIAL

## 2021-08-09 ENCOUNTER — PATIENT MESSAGE (OUTPATIENT)
Dept: FAMILY MEDICINE | Facility: CLINIC | Age: 41
End: 2021-08-09

## 2021-08-09 VITALS
BODY MASS INDEX: 25.61 KG/M2 | TEMPERATURE: 98 F | HEIGHT: 68 IN | RESPIRATION RATE: 16 BRPM | DIASTOLIC BLOOD PRESSURE: 79 MMHG | SYSTOLIC BLOOD PRESSURE: 119 MMHG | HEART RATE: 98 BPM | OXYGEN SATURATION: 98 % | WEIGHT: 169 LBS

## 2021-08-09 DIAGNOSIS — B02.29 POST HERPETIC NEURALGIA: Primary | ICD-10-CM

## 2021-08-09 PROCEDURE — 1125F PR PAIN SEVERITY QUANTIFIED, PAIN PRESENT: ICD-10-PCS | Mod: CPTII,S$GLB,, | Performed by: FAMILY MEDICINE

## 2021-08-09 PROCEDURE — 3008F PR BODY MASS INDEX (BMI) DOCUMENTED: ICD-10-PCS | Mod: CPTII,S$GLB,, | Performed by: FAMILY MEDICINE

## 2021-08-09 PROCEDURE — 3074F SYST BP LT 130 MM HG: CPT | Mod: CPTII,S$GLB,, | Performed by: FAMILY MEDICINE

## 2021-08-09 PROCEDURE — 1125F AMNT PAIN NOTED PAIN PRSNT: CPT | Mod: CPTII,S$GLB,, | Performed by: FAMILY MEDICINE

## 2021-08-09 PROCEDURE — 99214 PR OFFICE/OUTPT VISIT, EST, LEVL IV, 30-39 MIN: ICD-10-PCS | Mod: S$GLB,,, | Performed by: FAMILY MEDICINE

## 2021-08-09 PROCEDURE — 3074F PR MOST RECENT SYSTOLIC BLOOD PRESSURE < 130 MM HG: ICD-10-PCS | Mod: CPTII,S$GLB,, | Performed by: FAMILY MEDICINE

## 2021-08-09 PROCEDURE — 1160F PR REVIEW ALL MEDS BY PRESCRIBER/CLIN PHARMACIST DOCUMENTED: ICD-10-PCS | Mod: CPTII,S$GLB,, | Performed by: FAMILY MEDICINE

## 2021-08-09 PROCEDURE — 3008F BODY MASS INDEX DOCD: CPT | Mod: CPTII,S$GLB,, | Performed by: FAMILY MEDICINE

## 2021-08-09 PROCEDURE — 1159F MED LIST DOCD IN RCRD: CPT | Mod: CPTII,S$GLB,, | Performed by: FAMILY MEDICINE

## 2021-08-09 PROCEDURE — 3078F DIAST BP <80 MM HG: CPT | Mod: CPTII,S$GLB,, | Performed by: FAMILY MEDICINE

## 2021-08-09 PROCEDURE — 1159F PR MEDICATION LIST DOCUMENTED IN MEDICAL RECORD: ICD-10-PCS | Mod: CPTII,S$GLB,, | Performed by: FAMILY MEDICINE

## 2021-08-09 PROCEDURE — 3078F PR MOST RECENT DIASTOLIC BLOOD PRESSURE < 80 MM HG: ICD-10-PCS | Mod: CPTII,S$GLB,, | Performed by: FAMILY MEDICINE

## 2021-08-09 PROCEDURE — 1160F RVW MEDS BY RX/DR IN RCRD: CPT | Mod: CPTII,S$GLB,, | Performed by: FAMILY MEDICINE

## 2021-08-09 PROCEDURE — 99214 OFFICE O/P EST MOD 30 MIN: CPT | Mod: S$GLB,,, | Performed by: FAMILY MEDICINE

## 2021-08-09 RX ORDER — GABAPENTIN 100 MG/1
100 CAPSULE ORAL 2 TIMES DAILY
Qty: 60 CAPSULE | Refills: 0 | Status: SHIPPED | OUTPATIENT
Start: 2021-08-09 | End: 2022-06-03

## 2022-02-15 ENCOUNTER — PATIENT MESSAGE (OUTPATIENT)
Dept: FAMILY MEDICINE | Facility: CLINIC | Age: 42
End: 2022-02-15
Payer: OTHER MISCELLANEOUS

## 2022-06-01 ENCOUNTER — PATIENT MESSAGE (OUTPATIENT)
Dept: ADMINISTRATIVE | Facility: HOSPITAL | Age: 42
End: 2022-06-01
Payer: OTHER MISCELLANEOUS

## 2022-06-02 ENCOUNTER — PATIENT OUTREACH (OUTPATIENT)
Dept: ADMINISTRATIVE | Facility: HOSPITAL | Age: 42
End: 2022-06-02
Payer: OTHER MISCELLANEOUS

## 2022-06-02 RX ORDER — AZITHROMYCIN 250 MG/1
TABLET, FILM COATED ORAL
COMMUNITY
Start: 2022-04-04 | End: 2022-06-03

## 2022-06-02 RX ORDER — COVID-19 MOLECULAR TEST ASSAY
KIT MISCELLANEOUS
COMMUNITY
Start: 2022-02-25

## 2022-06-03 ENCOUNTER — OFFICE VISIT (OUTPATIENT)
Dept: FAMILY MEDICINE | Facility: CLINIC | Age: 42
End: 2022-06-03
Payer: COMMERCIAL

## 2022-06-03 VITALS
DIASTOLIC BLOOD PRESSURE: 74 MMHG | HEART RATE: 68 BPM | BODY MASS INDEX: 26.82 KG/M2 | TEMPERATURE: 98 F | SYSTOLIC BLOOD PRESSURE: 118 MMHG | WEIGHT: 176.94 LBS | HEIGHT: 68 IN | OXYGEN SATURATION: 98 %

## 2022-06-03 DIAGNOSIS — E78.2 MIXED HYPERLIPIDEMIA: Chronic | ICD-10-CM

## 2022-06-03 DIAGNOSIS — F43.22 ADJUSTMENT DISORDER WITH ANXIETY: ICD-10-CM

## 2022-06-03 DIAGNOSIS — Z00.00 ROUTINE MEDICAL EXAM: Primary | ICD-10-CM

## 2022-06-03 DIAGNOSIS — F17.200 TOBACCO DEPENDENCE: Chronic | ICD-10-CM

## 2022-06-03 PROBLEM — J32.9 SINUSITIS: Status: RESOLVED | Noted: 2020-12-10 | Resolved: 2022-06-03

## 2022-06-03 PROBLEM — R07.89 CHEST PAIN, ATYPICAL: Status: RESOLVED | Noted: 2020-11-13 | Resolved: 2022-06-03

## 2022-06-03 PROCEDURE — 99396 PR PREVENTIVE VISIT,EST,40-64: ICD-10-PCS | Mod: S$GLB,,, | Performed by: INTERNAL MEDICINE

## 2022-06-03 PROCEDURE — 99999 PR PBB SHADOW E&M-EST. PATIENT-LVL V: ICD-10-PCS | Mod: PBBFAC,,, | Performed by: INTERNAL MEDICINE

## 2022-06-03 PROCEDURE — 99999 PR PBB SHADOW E&M-EST. PATIENT-LVL V: CPT | Mod: PBBFAC,,, | Performed by: INTERNAL MEDICINE

## 2022-06-03 PROCEDURE — 99396 PREV VISIT EST AGE 40-64: CPT | Mod: S$GLB,,, | Performed by: INTERNAL MEDICINE

## 2022-06-03 NOTE — PROGRESS NOTES
Assessment & Plan  Problem List Items Addressed This Visit        Cardiac/Vascular    Mixed hyperlipidemia (Chronic)    Overview     The 10-year ASCVD risk score (Gallupblake CUNNINGHAM Jr., et al., 2013) is: 7.9%               Current Assessment & Plan     The current medical regimen is effective;  continue present plan and medications.               Other    Tobacco dependence (Chronic)    Current Assessment & Plan     I counseled the patient on smoking cessation, risks associated with smoking, having a quit date, nicotine replacement, medications that can aid in cessation, and having a plan for future cravings.  The patient stated that he is thinking about quitting before 2027           Relevant Orders    Ambulatory referral/consult to Smoking Cessation Program      Other Visit Diagnoses     Routine medical exam    -  Primary  -    Discussed healthy diet, regular exercise, necessary labs, age appropriate cancer screening, and routine vaccinations.       Relevant Orders    Comprehensive Metabolic Panel    Lipid Panel    Adjustment disorder with anxiety  -  I counseled the patient on several coping mechanisms, recommended he choose a couple to try at a time, and discussed the need to practice them.                   Health Maintenance reviewed, counseled on boosters.    Follow-up: Follow up in about 1 year (around 6/3/2023) for Routine Physical.  ______________________________________________________________________    Chief Complaint  Chief Complaint   Patient presents with    Annual Exam       HPI  Shady Ferreira is a 41 y.o. male with medical diagnoses as listed in the medical history and problem list that presents for routine physical.  Pt is known to me with their last appointment 01/2021.  He is due for labs.     Having some increase in stress at work with new positions.      JOSE DE JESUS-7 Score: 12  Interpretation: Moderate Anxiety    No acute complaints.      PAST MEDICAL HISTORY:  Past Medical History:   Diagnosis Date     Calcaneus fracture, right     Herpes genitalia        PAST SURGICAL HISTORY:  Past Surgical History:   Procedure Laterality Date    FINGER SURGERY Left     index tendon repair       SOCIAL HISTORY:  Social History     Socioeconomic History    Marital status:    Tobacco Use    Smoking status: Current Every Day Smoker     Packs/day: 1.00     Years: 24.00     Pack years: 24.00     Types: Cigarettes     Start date: 12/20/1985    Smokeless tobacco: Never Used   Substance and Sexual Activity    Alcohol use: Yes     Comment: rare    Drug use: No    Sexual activity: Yes     Partners: Female     Comment:        FAMILY HISTORY:  Family History   Problem Relation Age of Onset    Alzheimer's disease Maternal Grandmother     Alzheimer's disease Maternal Grandfather     Cancer Paternal Grandmother         breast    Heart disease Paternal Grandfather        ALLERGIES AND MEDICATIONS: updated and reviewed.  Review of patient's allergies indicates:  No Known Allergies  Current Outpatient Medications   Medication Sig Dispense Refill    pravastatin (PRAVACHOL) 20 MG tablet TAKE 1 TABLET BY MOUTH EVERY DAY 30 tablet 3    butalbital-acetaminophen-caff -40 mg Cap Take 1 capsule by mouth once daily.      fluticasone propionate (FLONASE) 50 mcg/actuation nasal spray 1 spray by Each Nostril route once daily.      ID NOW COVID-19 TEST KIT Kit TEST AS DIRECTED TODAY       No current facility-administered medications for this visit.         ROS  Review of Systems   Constitutional: Negative for chills, fever and unexpected weight change.   HENT: Negative for congestion, dental problem, ear pain, hearing loss, rhinorrhea, sore throat and trouble swallowing.    Eyes: Negative for discharge, redness and visual disturbance.   Respiratory: Negative for cough, chest tightness, shortness of breath and wheezing.    Cardiovascular: Negative for chest pain, palpitations and leg swelling.   Gastrointestinal: Negative  "for abdominal pain, constipation, diarrhea, nausea and vomiting.   Endocrine: Negative for polydipsia, polyphagia and polyuria.   Genitourinary: Negative for decreased urine volume, dysuria and hematuria.   Musculoskeletal: Negative for arthralgias and myalgias.   Skin: Negative for color change and rash.   Neurological: Negative for dizziness, weakness, light-headedness and headaches.   Psychiatric/Behavioral: Negative for decreased concentration, dysphoric mood, sleep disturbance and suicidal ideas.           Physical Exam  Vitals:    06/03/22 0709   BP: 118/74   BP Location: Right arm   Patient Position: Sitting   BP Method: Large (Manual)   Pulse: 68   Temp: 97.6 °F (36.4 °C)   TempSrc: Oral   SpO2: 98%   Weight: 80.3 kg (176 lb 14.7 oz)   Height: 5' 8" (1.727 m)    Body mass index is 26.9 kg/m².  Weight: 80.3 kg (176 lb 14.7 oz)   Height: 5' 8" (172.7 cm)   Physical Exam  Constitutional:       General: He is not in acute distress.     Appearance: He is well-developed.   HENT:      Head: Normocephalic and atraumatic.   Eyes:      General: Lids are normal. No scleral icterus.     Conjunctiva/sclera: Conjunctivae normal.      Pupils: Pupils are equal, round, and reactive to light.   Neck:      Thyroid: No thyromegaly.      Vascular: No carotid bruit or JVD.   Cardiovascular:      Rate and Rhythm: Normal rate and regular rhythm.      Heart sounds: Normal heart sounds. No murmur heard.    No friction rub. No S3 or S4 sounds.   Pulmonary:      Effort: Pulmonary effort is normal.      Breath sounds: Normal breath sounds. No wheezing, rhonchi or rales.   Abdominal:      General: Bowel sounds are normal. There is no distension.      Palpations: Abdomen is soft.      Tenderness: There is no abdominal tenderness.   Musculoskeletal:         General: No tenderness.      Cervical back: Full passive range of motion without pain and neck supple.      Right lower leg: No edema.      Left lower leg: No edema.   Skin:     " General: Skin is warm and dry.      Findings: No rash.   Neurological:      Mental Status: He is alert and oriented to person, place, and time.   Psychiatric:         Speech: Speech normal.         Behavior: Behavior normal.         Thought Content: Thought content normal.             Health Maintenance       Date Due Completion Date    Pneumococcal Vaccines (Age 0-64) (2 - PCV) 06/11/2020 6/11/2019    COVID-19 Vaccine (3 - Booster for Pfizer series) 01/13/2022 8/13/2021    Influenza Vaccine (Season Ended) 09/01/2022 11/12/2020    TETANUS VACCINE 08/28/2025 8/28/2015    Lipid Panel 05/13/2026 5/13/2021

## 2022-06-03 NOTE — ASSESSMENT & PLAN NOTE
I counseled the patient on smoking cessation, risks associated with smoking, having a quit date, nicotine replacement, medications that can aid in cessation, and having a plan for future cravings.  The patient stated that he is thinking about quitting before 2027

## 2022-06-22 DIAGNOSIS — E78.2 MIXED HYPERLIPIDEMIA: Chronic | ICD-10-CM

## 2022-06-22 RX ORDER — PRAVASTATIN SODIUM 20 MG/1
TABLET ORAL
Qty: 90 TABLET | Refills: 0 | Status: SHIPPED | OUTPATIENT
Start: 2022-06-22 | End: 2022-09-21

## 2022-06-22 NOTE — TELEPHONE ENCOUNTER
Refill Routing Note   Medication(s) are not appropriate for processing by [facility] for the following reason(s):      - Required laboratory values are outdated    [facility] action(s):  Defer          Medication reconciliation completed: No     Appointments  past 12m or future 3m with PCP    Date Provider   Last Visit   6/3/2022 Tho Plata MD   Next Visit   Visit date not found Tho Plata MD   ED visits in past 90 days: 0        Note composed:9:46 AM 06/22/2022

## 2022-06-22 NOTE — TELEPHONE ENCOUNTER
Care Due:                  Date            Visit Type   Department     Provider  --------------------------------------------------------------------------------                                EP [facility]                              PRIMARY      MED/ INTERNAL  Last Visit: 06-      CARE ([facility])   MED/ PEDS      Tho Plata  Next Visit: None Scheduled  None         None Found                                                            Last  Test          Frequency    Reason                     Performed    Due Date  --------------------------------------------------------------------------------    CMP.........  12 months..  pravastatin..............  05- 05-    Lipid Panel.  12 months..  pravastatin..............  05- 05-    [employer] Embedded Care Gaps. Reference number: [id number]. 6/22/2022   12:29:19 AM CDT

## 2023-06-26 ENCOUNTER — PATIENT MESSAGE (OUTPATIENT)
Dept: FAMILY MEDICINE | Facility: CLINIC | Age: 43
End: 2023-06-26
Payer: COMMERCIAL

## 2024-02-01 ENCOUNTER — HOSPITAL ENCOUNTER (OUTPATIENT)
Dept: RADIOLOGY | Facility: HOSPITAL | Age: 44
Discharge: HOME | End: 2024-02-01
Attending: NURSE PRACTITIONER
Payer: COMMERCIAL

## 2024-02-01 ENCOUNTER — OFFICE VISIT (OUTPATIENT)
Dept: INTERNAL MEDICINE | Facility: CLINIC | Age: 44
End: 2024-02-01
Payer: COMMERCIAL

## 2024-02-01 ENCOUNTER — TELEPHONE (OUTPATIENT)
Dept: INTERNAL MEDICINE | Facility: CLINIC | Age: 44
End: 2024-02-01

## 2024-02-01 VITALS
BODY MASS INDEX: 28.66 KG/M2 | WEIGHT: 189.13 LBS | SYSTOLIC BLOOD PRESSURE: 116 MMHG | DIASTOLIC BLOOD PRESSURE: 78 MMHG | HEIGHT: 68 IN | OXYGEN SATURATION: 97 % | HEART RATE: 72 BPM

## 2024-02-01 DIAGNOSIS — G89.29 CHRONIC PAIN OF RIGHT HIP: ICD-10-CM

## 2024-02-01 DIAGNOSIS — F17.200 TOBACCO DEPENDENCE: Chronic | ICD-10-CM

## 2024-02-01 DIAGNOSIS — J30.9 ALLERGIC RHINITIS, UNSPECIFIED SEASONALITY, UNSPECIFIED TRIGGER: ICD-10-CM

## 2024-02-01 DIAGNOSIS — B07.0 PLANTAR WART: ICD-10-CM

## 2024-02-01 DIAGNOSIS — M25.562 CHRONIC PAIN OF BOTH KNEES: ICD-10-CM

## 2024-02-01 DIAGNOSIS — M25.561 CHRONIC PAIN OF BOTH KNEES: ICD-10-CM

## 2024-02-01 DIAGNOSIS — G89.29 CHRONIC PAIN OF BOTH KNEES: ICD-10-CM

## 2024-02-01 DIAGNOSIS — M25.551 CHRONIC PAIN OF RIGHT HIP: ICD-10-CM

## 2024-02-01 DIAGNOSIS — Z00.00 ROUTINE MEDICAL EXAM: Primary | ICD-10-CM

## 2024-02-01 DIAGNOSIS — Z23 NEEDS FLU SHOT: ICD-10-CM

## 2024-02-01 DIAGNOSIS — Z23 NEED FOR PNEUMOCOCCAL VACCINATION: ICD-10-CM

## 2024-02-01 DIAGNOSIS — E78.2 MIXED HYPERLIPIDEMIA: Chronic | ICD-10-CM

## 2024-02-01 PROCEDURE — 90471 IMMUNIZATION ADMIN: CPT | Mod: S$GLB,,, | Performed by: NURSE PRACTITIONER

## 2024-02-01 PROCEDURE — 3008F BODY MASS INDEX DOCD: CPT | Mod: CPTII,S$GLB,, | Performed by: NURSE PRACTITIONER

## 2024-02-01 PROCEDURE — 73502 X-RAY EXAM HIP UNI 2-3 VIEWS: CPT | Mod: TC,RT

## 2024-02-01 PROCEDURE — 1159F MED LIST DOCD IN RCRD: CPT | Mod: CPTII,S$GLB,, | Performed by: NURSE PRACTITIONER

## 2024-02-01 PROCEDURE — 3078F DIAST BP <80 MM HG: CPT | Mod: CPTII,S$GLB,, | Performed by: NURSE PRACTITIONER

## 2024-02-01 PROCEDURE — 1160F RVW MEDS BY RX/DR IN RCRD: CPT | Mod: CPTII,S$GLB,, | Performed by: NURSE PRACTITIONER

## 2024-02-01 PROCEDURE — 73502 X-RAY EXAM HIP UNI 2-3 VIEWS: CPT | Mod: 26,RT,, | Performed by: INTERNAL MEDICINE

## 2024-02-01 PROCEDURE — 73565 X-RAY EXAM OF KNEES: CPT | Mod: 26,,, | Performed by: INTERNAL MEDICINE

## 2024-02-01 PROCEDURE — 99999 PR PBB SHADOW E&M-EST. PATIENT-LVL V: CPT | Mod: PBBFAC,,, | Performed by: NURSE PRACTITIONER

## 2024-02-01 PROCEDURE — 90686 IIV4 VACC NO PRSV 0.5 ML IM: CPT | Mod: S$GLB,,, | Performed by: NURSE PRACTITIONER

## 2024-02-01 PROCEDURE — 90677 PCV20 VACCINE IM: CPT | Mod: S$GLB,,, | Performed by: NURSE PRACTITIONER

## 2024-02-01 PROCEDURE — 73565 X-RAY EXAM OF KNEES: CPT | Mod: TC

## 2024-02-01 PROCEDURE — 3074F SYST BP LT 130 MM HG: CPT | Mod: CPTII,S$GLB,, | Performed by: NURSE PRACTITIONER

## 2024-02-01 PROCEDURE — 99396 PREV VISIT EST AGE 40-64: CPT | Mod: 25,S$GLB,, | Performed by: NURSE PRACTITIONER

## 2024-02-01 PROCEDURE — 90472 IMMUNIZATION ADMIN EACH ADD: CPT | Mod: S$GLB,,, | Performed by: NURSE PRACTITIONER

## 2024-02-01 RX ORDER — MELOXICAM 15 MG/1
15 TABLET ORAL DAILY
Qty: 30 TABLET | Refills: 2 | Status: SHIPPED | OUTPATIENT
Start: 2024-02-01 | End: 2024-05-16

## 2024-02-01 RX ORDER — FLUTICASONE PROPIONATE 50 MCG
1 SPRAY, SUSPENSION (ML) NASAL DAILY
Qty: 16 G | Refills: 5 | Status: SHIPPED | OUTPATIENT
Start: 2024-02-01

## 2024-02-01 RX ORDER — PRAVASTATIN SODIUM 20 MG/1
20 TABLET ORAL DAILY
Qty: 90 TABLET | Refills: 1 | Status: SHIPPED | OUTPATIENT
Start: 2024-02-01 | End: 2024-02-02 | Stop reason: DRUGHIGH

## 2024-02-01 RX ORDER — MINERAL OIL
180 ENEMA (ML) RECTAL DAILY
Qty: 90 TABLET | Refills: 1 | Status: SHIPPED | OUTPATIENT
Start: 2024-02-01 | End: 2025-01-31

## 2024-02-01 NOTE — TELEPHONE ENCOUNTER
The pt was informed the x-rays of his knees and hip are normal. Provider recommends trying Mobic to see if this helps. If not, provider will get him in with orthopedics for further evaluation.

## 2024-02-01 NOTE — TELEPHONE ENCOUNTER
Please let the patient know the x-rays of his knees and hip are normal. I recommend we try the Mobic and see if this helps. If not, I will get him in with orthopedics for further evaluation.    Thanks!  Elena MCDANIEL

## 2024-02-01 NOTE — PROGRESS NOTES
History of Present Illness   Shady Ferreira is a 43 y.o. man with medical history as listed below who presents today for routine physical exam. He did not have labs prior to our visit. He is not taking medications as prescribed reports being out of his statin >1 year. Reports no perceived SE to medications. Complaints today: several, see below. Pertinent negatives as listed in ROS. We will address HM today.    Hoarseness with sore throat for about one month. Some mild congestion and post nasal drip. Takes Zyrtec daily.  Bilateral knee pain, chronic but worsening. Pain with going up or down stairs, some clicking and popping when squatting down. No relief with Tylenol.  Right hip pain, chronic but worsening. Worse with certain movements. No radiation. No improvement with Tylenol.  Plantar warts, bilateral hands. No improvement with OTC treatment.    Past Medical History:   Diagnosis Date    Calcaneus fracture, right     Herpes genitalia        Past Surgical History:   Procedure Laterality Date    FINGER SURGERY Left     index tendon repair       Social History     Socioeconomic History    Marital status:    Tobacco Use    Smoking status: Every Day     Current packs/day: 1.00     Average packs/day: 1 pack/day for 38.1 years (38.1 ttl pk-yrs)     Types: Cigarettes     Start date: 12/20/1985    Smokeless tobacco: Never   Substance and Sexual Activity    Alcohol use: Yes     Comment: rare    Drug use: No    Sexual activity: Yes     Partners: Female     Comment:      Social Determinants of Health     Financial Resource Strain: Low Risk  (1/31/2024)    Overall Financial Resource Strain (CARDIA)     Difficulty of Paying Living Expenses: Not hard at all   Food Insecurity: No Food Insecurity (1/31/2024)    Hunger Vital Sign     Worried About Running Out of Food in the Last Year: Never true     Ran Out of Food in the Last Year: Never true   Transportation Needs: No Transportation Needs (1/31/2024)    PRAPARE -  Transportation     Lack of Transportation (Medical): No     Lack of Transportation (Non-Medical): No   Physical Activity: Unknown (1/31/2024)    Exercise Vital Sign     Days of Exercise per Week: 0 days   Stress: No Stress Concern Present (1/31/2024)    [employer] [place] [employer] - Occupational Stress Questionnaire     Feeling of Stress : Not at all   Social Connections: Unknown (1/31/2024)    Social Connection and Isolation Panel [NHANES]     Frequency of Communication with Friends and Family: More than three times a week     Frequency of Social Gatherings with Friends and Family: Once a week     Active Member of Clubs or Organizations: Yes     Attends Club or Organization Meetings: More than 4 times per year     Marital Status:    Housing Stability: Low Risk  (1/31/2024)    Housing Stability Vital Sign     Unable to Pay for Housing in the Last Year: No     Number of Places Lived in the Last Year: 1     Unstable Housing in the Last Year: No       Family History   Problem Relation Age of Onset    Alzheimer's disease Maternal Grandmother     Alzheimer's disease Maternal Grandfather     Cancer Paternal Grandmother         breast    Heart disease Paternal Grandfather        Review of Systems  Review of Systems   Constitutional:  Negative for malaise/fatigue and weight loss.   HENT:  Positive for congestion and sore throat. Negative for hearing loss and tinnitus.    Eyes:  Negative for blurred vision, double vision and discharge.   Respiratory:  Negative for cough, shortness of breath and wheezing.    Cardiovascular:  Negative for chest pain, palpitations, orthopnea, claudication and leg swelling.   Gastrointestinal:  Negative for blood in stool, constipation, diarrhea, melena and vomiting.   Genitourinary:  Negative for flank pain, hematuria and urgency.   Musculoskeletal:  Positive for joint pain. Negative for back pain and neck pain.   Skin:  Negative for itching and rash.   Neurological:   "Negative for dizziness, loss of consciousness, weakness and headaches.   Endo/Heme/Allergies:  Negative for polydipsia.   Psychiatric/Behavioral:  Negative for depression. The patient is not nervous/anxious and does not have insomnia.      A complete review of systems was otherwise negative.    Physical Exam  /78 (BP Location: Left arm, Patient Position: Sitting, BP Method: Large (Manual))   Pulse 72   Ht 5' 8" (1.727 m)   Wt 85.8 kg (189 lb 2.5 oz)   SpO2 97%   BMI 28.76 kg/m²   General appearance: alert, appears stated age, cooperative, and no distress  Head: Normocephalic, without obvious abnormality, atraumatic  Eyes: negative findings: lids and lashes normal and conjunctivae and sclerae normal  Ears: normal TM's and external ear canals both ears  Nose: Nares normal. Septum midline. Mucosa normal. No drainage or sinus tenderness.  Throat: lips, mucosa, and tongue normal; teeth and gums normal and moderate posterior oropharynx erythema with clear post nasal drainage  Neck: no adenopathy, no carotid bruit, no JVD, supple, symmetrical, trachea midline, and thyroid not enlarged, symmetric, no tenderness/mass/nodules  Back: symmetric, no curvature. ROM normal. No CVA tenderness.  Lungs: clear to auscultation bilaterally  Heart: regular rate and rhythm, S1, S2 normal, no murmur, click, rub or gallop  Abdomen: soft, non-tender; bowel sounds normal; no masses,  no organomegaly  Extremities: extremities normal, atraumatic, no cyanosis or edema  Pulses: 2+ and symmetric  Skin: Skin color, texture, turgor normal. No rashes or lesions  Lymph nodes: Cervical, supraclavicular, and axillary nodes normal.  Neurologic: Grossly normal    Assessment/Plan  Routine medical exam  Age appropriate screening recommendations and HM were discussed and updated.  Discussed importance of heart healthy diet and exercise.  Routine labs as below.  Follow-up TBD.  -     CBC Auto Differential; Future; Expected date: 02/01/2024  -     " Comprehensive Metabolic Panel; Future; Expected date: 02/01/2024  -     LIPID PANEL; Future; Expected date: 02/01/2024  -     HEMOGLOBIN A1C; Future; Expected date: 02/01/2024    Mixed hyperlipidemia  Check lipids today.  Resume statin therapy.  -     pravastatin (PRAVACHOL) 20 MG tablet; Take 1 tablet (20 mg total) by mouth once daily.  Dispense: 90 tablet; Refill: 1    Chronic pain of both knees  X-ray for further evaluation, suspect arthritis/degeneration.  Trial of Mobic daily.  -     X-Ray Knee AP Standing Bilateral; Future; Expected date: 02/01/2024  -     meloxicam (MOBIC) 15 MG tablet; Take 1 tablet (15 mg total) by mouth once daily.  Dispense: 30 tablet; Refill: 2    Plantar wart  Referral to derm for further evaluation/management.  -     Ambulatory referral/consult to Dermatology; Future; Expected date: 02/08/2024    Allergic rhinitis, unspecified seasonality, unspecified trigger  Trial of Allegra and Flonase daily.  He reports several chemical irritants at work.  If no improvement, would sent to ENT for further evaluation of hoarseness given that he is a heavy smoker.  -     fexofenadine (ALLEGRA) 180 MG tablet; Take 1 tablet (180 mg total) by mouth once daily.  Dispense: 90 tablet; Refill: 1  -     fluticasone propionate (FLONASE) 50 mcg/actuation nasal spray; 1 spray (50 mcg total) by Each Nostril route once daily.  Dispense: 16 g; Refill: 5    Chronic pain of right hip  X-ray for further evaluation, suspect arthritis/degeneration.  Trial of Mobic daily.  -     X-Ray Hip 2 or 3 views Right (with Pelvis when performed); Future; Expected date: 02/01/2024  -     meloxicam (MOBIC) 15 MG tablet; Take 1 tablet (15 mg total) by mouth once daily.  Dispense: 30 tablet; Refill: 2    Tobacco dependence  Interested in cessation referral placed.  -     Ambulatory referral/consult to Smoking Cessation Program; Future; Expected date: 02/08/2024    Needs flu shot  Given today.  -     Influenza - Quadrivalent  *Preferred* (6 months+) (PF)    Need for pneumococcal vaccination  Given today.  -     (In Office Administered) Pneumococcal Conjugate Vaccine (20 Valent) (IM) (Preferred)      Patient has verbalized understanding and is in agreement with plan of care.    Follow up for TBD pending labs.      Answers submitted by the patient for this visit:  Review of Systems Questionnaire (Submitted on 1/31/2024)  activity change: No  unexpected weight change: No  rhinorrhea: No  trouble swallowing: No  visual disturbance: No  chest tightness: No  polyuria: No  difficulty urinating: No  joint swelling: No  arthralgias: Yes  confusion: No  dysphoric mood: No

## 2024-02-02 ENCOUNTER — TELEPHONE (OUTPATIENT)
Dept: INTERNAL MEDICINE | Facility: CLINIC | Age: 44
End: 2024-02-02
Payer: COMMERCIAL

## 2024-02-02 DIAGNOSIS — E78.2 MIXED HYPERLIPIDEMIA: Primary | Chronic | ICD-10-CM

## 2024-02-02 RX ORDER — PRAVASTATIN SODIUM 40 MG/1
40 TABLET ORAL DAILY
Qty: 90 TABLET | Refills: 3 | Status: SHIPPED | OUTPATIENT
Start: 2024-02-02 | End: 2025-02-01

## 2024-02-02 NOTE — TELEPHONE ENCOUNTER
----- Message from [employer] sent at 2/2/2024  1:52 PM CST -----  Contact: patient portal message  Patient is returning a phone call.  Who left a message for the patient: Nicole Quintanilla MA  Does patient know what this is regarding:    Would you like a call back, or a response through your [facility] portal?:   portal message   Comments:

## 2024-02-02 NOTE — TELEPHONE ENCOUNTER
Pt was informed his labs are overall looking good, but his cholesterol is VERY high. Elena Child NP needs him back on his cholesterol medication with an  increase in dose. Dose was increased to Pravastatin 40 mg. Pt was also informed he can take 2 of the 20 mg tablets until he runs out. Pt acknowledged he needs to repeat labs in 3 months to see how the cholesterol is doing.

## 2024-02-02 NOTE — TELEPHONE ENCOUNTER
Please let the patient know his labs are overall looking good, but his cholesterol is VERY high. I need him back on his cholesterol medication, and I actually need to increase his dose. I am increasing to pravastatin 40 mg. He can take 2 of the 20 mg tablets until he runs out. I would like him to repeat labs in 3 months to see how the cholesterol is doing. Please schedule.    Thanks!  Elena MCDANIEL

## 2024-02-06 ENCOUNTER — TELEPHONE (OUTPATIENT)
Dept: DERMATOLOGY | Facility: CLINIC | Age: 44
End: 2024-02-06
Payer: COMMERCIAL

## 2024-02-06 NOTE — TELEPHONE ENCOUNTER
I spoke with Giulia Jhon about a message in my in-basket that he needs assistance with scheduling an appointment.  He informed me that his wife already made an appointment with another dermatologist

## 2024-02-06 NOTE — TELEPHONE ENCOUNTER
Pt is inquiring about taking the medication that he was prescribed at his last appointment at night. Is that okay for him to do?

## 2024-02-07 ENCOUNTER — PATIENT MESSAGE (OUTPATIENT)
Dept: INTERNAL MEDICINE | Facility: CLINIC | Age: 44
End: 2024-02-07
Payer: COMMERCIAL

## 2024-02-08 NOTE — TELEPHONE ENCOUNTER
Pt states the new medications he has been taking, makes him feel weak and tired, not his normal self.

## 2024-02-15 ENCOUNTER — TELEPHONE (OUTPATIENT)
Dept: DERMATOLOGY | Facility: CLINIC | Age: 44
End: 2024-02-15
Payer: COMMERCIAL

## 2024-02-15 NOTE — TELEPHONE ENCOUNTER
I left a message stating the purpose of my call is to assist with scheduling an appointment for dermatology.  I stated there are not [facility] dermatologist at the [facility] location, and that I can assist with scheduling him at the [facility] main [place] or any other location.

## 2024-03-04 ENCOUNTER — PATIENT MESSAGE (OUTPATIENT)
Dept: INTERNAL MEDICINE | Facility: CLINIC | Age: 44
End: 2024-03-04
Payer: COMMERCIAL

## 2024-05-03 ENCOUNTER — LAB VISIT (OUTPATIENT)
Dept: LAB | Facility: HOSPITAL | Age: 44
End: 2024-05-03
Payer: COMMERCIAL

## 2024-05-03 DIAGNOSIS — E78.2 MIXED HYPERLIPIDEMIA: Chronic | ICD-10-CM

## 2024-05-03 LAB
ALBUMIN SERPL BCP-MCNC: 3.7 G/DL (ref 3.5–5.2)
ALP SERPL-CCNC: 107 U/L (ref 55–135)
ALT SERPL W/O P-5'-P-CCNC: 29 U/L (ref 10–44)
ANION GAP SERPL CALC-SCNC: 9 MMOL/L (ref 8–16)
AST SERPL-CCNC: 22 U/L (ref 10–40)
BILIRUB SERPL-MCNC: 0.5 MG/DL (ref 0.1–1)
BUN SERPL-MCNC: 10 MG/DL (ref 6–20)
CALCIUM SERPL-MCNC: 9.6 MG/DL (ref 8.7–10.5)
CHLORIDE SERPL-SCNC: 109 MMOL/L (ref 95–110)
CHOLEST SERPL-MCNC: 198 MG/DL (ref 120–199)
CHOLEST/HDLC SERPL: 5.7 {RATIO} (ref 2–5)
CO2 SERPL-SCNC: 21 MMOL/L (ref 23–29)
CREAT SERPL-MCNC: 1.1 MG/DL (ref 0.5–1.4)
EST. GFR  (NO RACE VARIABLE): >60 ML/MIN/1.73 M^2
GLUCOSE SERPL-MCNC: 92 MG/DL (ref 70–110)
HDLC SERPL-MCNC: 35 MG/DL (ref 40–75)
HDLC SERPL: 17.7 % (ref 20–50)
LDLC SERPL CALC-MCNC: 133.2 MG/DL (ref 63–159)
NONHDLC SERPL-MCNC: 163 MG/DL
POTASSIUM SERPL-SCNC: 4.1 MMOL/L (ref 3.5–5.1)
PROT SERPL-MCNC: 6.9 G/DL (ref 6–8.4)
SODIUM SERPL-SCNC: 139 MMOL/L (ref 136–145)
TRIGL SERPL-MCNC: 149 MG/DL (ref 30–150)

## 2024-05-03 PROCEDURE — 36415 COLL VENOUS BLD VENIPUNCTURE: CPT | Mod: PO | Performed by: NURSE PRACTITIONER

## 2024-05-03 PROCEDURE — 80053 COMPREHEN METABOLIC PANEL: CPT | Performed by: NURSE PRACTITIONER

## 2024-05-03 PROCEDURE — 80061 LIPID PANEL: CPT | Performed by: NURSE PRACTITIONER

## 2024-05-06 NOTE — PROGRESS NOTES
These lab results are looking fine, and you do not need any change in your medications at this time other than getting back on your pravastatin as discussed with Elena.     Sincerely,  Tho Plata MD

## 2024-05-16 DIAGNOSIS — G89.29 CHRONIC PAIN OF BOTH KNEES: ICD-10-CM

## 2024-05-16 DIAGNOSIS — M25.562 CHRONIC PAIN OF BOTH KNEES: ICD-10-CM

## 2024-05-16 DIAGNOSIS — G89.29 CHRONIC PAIN OF RIGHT HIP: ICD-10-CM

## 2024-05-16 DIAGNOSIS — M25.561 CHRONIC PAIN OF BOTH KNEES: ICD-10-CM

## 2024-05-16 DIAGNOSIS — M25.551 CHRONIC PAIN OF RIGHT HIP: ICD-10-CM

## 2024-05-16 RX ORDER — MELOXICAM 15 MG/1
15 TABLET ORAL
Qty: 30 TABLET | Refills: 1 | Status: SHIPPED | OUTPATIENT
Start: 2024-05-16

## 2024-05-16 NOTE — TELEPHONE ENCOUNTER
Refill Routing Note   Medication(s) are not appropriate for processing by [facility] for the following reason(s):        Non-participating provider  Responsible provider unclear    [facility] action(s):  Route             Appointments  past 12m or future 3m with PCP    Date Provider   Last Visit   2/1/2024 Elena Child NP   Next Visit   Visit date not found Elena Child NP   ED visits in past 90 days: 0        Note composed:7:43 AM 05/16/2024

## 2024-08-05 DIAGNOSIS — G89.29 CHRONIC PAIN OF BOTH KNEES: ICD-10-CM

## 2024-08-05 DIAGNOSIS — M25.551 CHRONIC PAIN OF RIGHT HIP: ICD-10-CM

## 2024-08-05 DIAGNOSIS — M25.561 CHRONIC PAIN OF BOTH KNEES: ICD-10-CM

## 2024-08-05 DIAGNOSIS — J30.9 ALLERGIC RHINITIS, UNSPECIFIED SEASONALITY, UNSPECIFIED TRIGGER: ICD-10-CM

## 2024-08-05 DIAGNOSIS — G89.29 CHRONIC PAIN OF RIGHT HIP: ICD-10-CM

## 2024-08-05 DIAGNOSIS — M25.562 CHRONIC PAIN OF BOTH KNEES: ICD-10-CM

## 2024-08-06 RX ORDER — MINERAL OIL
180 ENEMA (ML) RECTAL DAILY
Qty: 90 TABLET | Refills: 1 | Status: SHIPPED | OUTPATIENT
Start: 2024-08-06 | End: 2025-08-06

## 2024-08-06 RX ORDER — MELOXICAM 15 MG/1
15 TABLET ORAL DAILY PRN
Qty: 90 TABLET | Refills: 0 | Status: SHIPPED | OUTPATIENT
Start: 2024-08-06

## 2024-08-12 ENCOUNTER — OFFICE VISIT (OUTPATIENT)
Dept: URGENT CARE | Facility: CLINIC | Age: 44
End: 2024-08-12
Payer: COMMERCIAL

## 2024-08-12 VITALS
BODY MASS INDEX: 26.73 KG/M2 | OXYGEN SATURATION: 97 % | SYSTOLIC BLOOD PRESSURE: 119 MMHG | RESPIRATION RATE: 16 BRPM | DIASTOLIC BLOOD PRESSURE: 82 MMHG | HEIGHT: 68 IN | HEART RATE: 69 BPM | WEIGHT: 176.38 LBS | TEMPERATURE: 98 F

## 2024-08-12 DIAGNOSIS — R53.83 FATIGUE, UNSPECIFIED TYPE: ICD-10-CM

## 2024-08-12 DIAGNOSIS — B34.9 ACUTE VIRAL SYNDROME: Primary | ICD-10-CM

## 2024-08-12 LAB
CTP QC/QA: YES
SARS-COV-2 AG RESP QL IA.RAPID: NEGATIVE

## 2024-08-12 PROCEDURE — 87811 SARS-COV-2 COVID19 W/OPTIC: CPT | Mod: QW,S$GLB,, | Performed by: PHYSICIAN ASSISTANT

## 2024-08-12 PROCEDURE — 99213 OFFICE O/P EST LOW 20 MIN: CPT | Mod: S$GLB,,, | Performed by: PHYSICIAN ASSISTANT

## 2024-08-12 RX ORDER — PROMETHAZINE HYDROCHLORIDE 25 MG/1
25 TABLET ORAL EVERY 6 HOURS PRN
Qty: 12 TABLET | Refills: 0 | Status: SHIPPED | OUTPATIENT
Start: 2024-08-12

## 2024-08-12 NOTE — LETTER
[address] [facility], [address] ? [place], [address] ? Phone [phone] ? Fax [phone]           Return to Work/School    Patient: Shady Ferreira  YOB: 1980   Date: 08/12/2024      To Whom It May Concern:     Shady Ferreira was in contact with/seen in my office on 08/12/2024. COVID-19 is present in our communities across the [facility].      Shady Ferreira has met the criteria for COVID-19 testing and has a NEGATIVE result. The employee can return to work once symptoms improve and without fever for 24 hours without the use of fever reducing medications (Tylenol, Motrin, etc).     If you have any questions or concerns, or if I can be of further assistance, please do not hesitate to contact me.     Sincerely,    Víctor Wheeler PA-C

## 2024-08-12 NOTE — PROGRESS NOTES
"Subjective:      Patient ID: Shady Ferreira is a 43 y.o. male.    Vitals:  height is 5' 8" (1.727 m) and weight is 80 kg (176 lb 5.9 oz). His oral temperature is 98 °F (36.7 °C). His blood pressure is 119/82 and his pulse is 69. His respiration is 16 and oxygen saturation is 97%.     Chief Complaint: Fatigue    44 y/o M presents with weakness, nausea, headache, body aches, and fatigue since Saturday.  Patient states he went to see a [employer] play Friday night and had a few beers.  Says he was feeling fine then.  When he woke up says he had some fatigue which he thought was hang over.  Says it has not improved.  Denies any known sick contacts or recent international travel.  He denies ear pain, sore throat, dysphagia, odynophagia, chest pain, shortness of breath, cough, vomiting, diarrhea or rash.  Patient states he has been eating chicken soup with crackers. MEB    Fatigue  This is a new problem. The current episode started in the past 7 days. The problem occurs constantly. The problem has been gradually worsening. Associated symptoms include abdominal pain, chills, congestion, fatigue, headaches and nausea. Pertinent negatives include no chest pain, diaphoresis, fever, myalgias, neck pain, rash, sore throat or vomiting. The symptoms are aggravated by bending. Treatments tried: zofran. The treatment provided no relief.       Constitution: Positive for chills and fatigue. Negative for sweating, fever and international travel in last 60 days.   HENT:  Positive for congestion. Negative for ear pain, sinus pain, sinus pressure, sore throat and trouble swallowing.    Neck: Negative for neck pain and painful lymph nodes.   Cardiovascular:  Negative for chest pain.   Respiratory:  Negative for shortness of breath.    Gastrointestinal:  Positive for abdominal pain and nausea. Negative for vomiting.   Musculoskeletal:  Negative for muscle ache.   Skin:  Negative for rash.   Neurological:  Positive for headaches. "   Hematologic/Lymphatic: Negative for swollen lymph nodes.      Objective:     Physical Exam   Constitutional: He appears well-developed. He is cooperative.  Non-toxic appearance. He does not appear ill. No distress.   HENT:   Head: Normocephalic and atraumatic.   Ears:   Right Ear: Hearing, tympanic membrane, external ear and ear canal normal.   Left Ear: Hearing, tympanic membrane, external ear and ear canal normal.   Nose: Mucosal edema present. No rhinorrhea or nasal deformity. No epistaxis. Right sinus exhibits no maxillary sinus tenderness and no frontal sinus tenderness. Left sinus exhibits no maxillary sinus tenderness and no frontal sinus tenderness.   Mouth/Throat: Uvula is midline, oropharynx is clear and moist and mucous membranes are normal. No trismus in the jaw. Normal dentition. No uvula swelling. No oropharyngeal exudate, posterior oropharyngeal edema or posterior oropharyngeal erythema.   Eyes: Conjunctivae and lids are normal. Right conjunctiva is not injected. Left conjunctiva is not injected. No scleral icterus.   Neck: Trachea normal and phonation normal. Neck supple. No edema present. No erythema present. No neck rigidity present.   Cardiovascular: Normal rate, regular rhythm and normal heart sounds.   Pulmonary/Chest: Effort normal and breath sounds normal. No respiratory distress. He has no decreased breath sounds. He has no rhonchi.   Abdominal: Normal appearance.   Musculoskeletal: Normal range of motion.         General: No deformity. Normal range of motion.      Cervical back: He exhibits no swelling.   Lymphadenopathy:        Head (right side): No submental and no submandibular adenopathy present.        Head (left side): No submental and no submandibular adenopathy present.     He has no cervical adenopathy.   Neurological: He is alert. He exhibits normal muscle tone. Coordination normal.   Skin: Skin is warm, dry, intact, not diaphoretic and not pale.   Psychiatric: He experiences  Normal attention. His speech is normal and behavior is normal. Mood and thought content normal.   Nursing note and vitals reviewed.        Results for orders placed or performed in visit on 08/12/24   SARS Coronavirus 2 Antigen, POCT Manual Read   Result Value Ref Range    SARS Coronavirus 2 Antigen Negative Negative     Acceptable Yes        Assessment:     1. Acute viral syndrome    2. Fatigue, unspecified type        Plan:       Acute viral syndrome  -     promethazine (PHENERGAN) 25 MG tablet; Take 1 tablet (25 mg total) by mouth every 6 (six) hours as needed for Nausea.  Dispense: 12 tablet; Refill: 0    Fatigue, unspecified type  -     SARS Coronavirus 2 Antigen, POCT Manual Read      Patient Instructions   Please drink plenty of fluids.    Please get plenty of rest.    Please return here or go to the Emergency Department for any concerns or worsening of condition.    If you were prescribed antibiotics, please take them to completion.    If you were prescribed a narcotic medication, do not drive or operate heavy equipment or machinery while taking these medications.    OVER THE COUNTER RECOMMENDATIONS/SUGGESTIONS.     Use Nasal Saline to mechanically move any post nasal drip from your eustachian tube or from the back of your throat.     Use warm salt water gargles to ease your throat pain. Warm salt water gargles as needed for sore throat-  1/2 tsp salt to 1 cup warm water, gargle as desired.     Use an antihistamine such as Claritin, Zyrtec or Allegra to dry you out.      Use pseudoephedrine (behind the counter) to decongest. Pseudoephedrine  30 mg up to 240 mg /day. It can raise your blood pressure and give you palpitations.    If you do not have Hypertension or any history of palpitations, it is ok to take over the counter Sudafed or Mucinex D or Allegra-D or Claritin-D or Zyrtec-D.  If you do take one of the above, it is ok to combine that with plain over the counter Mucinex or Allegra or  Claritin or Zyrtec.  If for example you are taking Zyrtec -D, you can combine that with Mucinex, but not Mucinex-D.  If you are taking Mucinex-D, you can combine that with plain Allegra or Claritin or Zyrtec.     If you do have Hypertension or palpitations, it is safe to take Coricidin HBP for relief of sinus symptoms.     Use Afrin in each nare for no longer than 3 days, as it is addictive. It can also dry out your mucous membranes and cause elevated blood pressure. This is especially useful if you are flying.     Use Flonase 1-2 sprays/nostril per day. It is a local acting steroid nasal spray, if you develop a bloody nose, stop using the medication immediately.     Sometimes Nyquil at night is beneficial to help you get some rest, however it is sedating and it does have an antihistamine, and tylenol.     Honey is a natural cough suppressant that can be used.     Tylenol up to 4,000 mg a day is safe for short periods and can be used for body aches, pain, and fever. However in high doses and prolonged use it can cause liver irritation.     Ibuprofen is a non-steroidal anti-inflammatory that can be used for body aches, pain, and fever.However it can also cause stomach irritation if over used.    If not allergic, please take over the counter Tylenol (Acetaminophen) and/or Motrin (Ibuprofen) as directed for control of pain and/or fever.    Please follow up with your primary care doctor or specialist as needed.    If you  smoke, please stop smoking.

## 2024-11-05 DIAGNOSIS — M25.562 CHRONIC PAIN OF BOTH KNEES: ICD-10-CM

## 2024-11-05 DIAGNOSIS — G89.29 CHRONIC PAIN OF BOTH KNEES: ICD-10-CM

## 2024-11-05 DIAGNOSIS — M25.551 CHRONIC PAIN OF RIGHT HIP: ICD-10-CM

## 2024-11-05 DIAGNOSIS — M25.561 CHRONIC PAIN OF BOTH KNEES: ICD-10-CM

## 2024-11-05 DIAGNOSIS — G89.29 CHRONIC PAIN OF RIGHT HIP: ICD-10-CM

## 2024-11-05 RX ORDER — MELOXICAM 15 MG/1
15 TABLET ORAL DAILY PRN
Qty: 30 TABLET | Refills: 2 | Status: SHIPPED | OUTPATIENT
Start: 2024-11-05

## 2024-11-05 NOTE — TELEPHONE ENCOUNTER
Refill Routing Note   Medication(s) are not appropriate for processing by [facility] for the following reason(s):        Outside of protocol    [facility] action(s):  Route   Requires appointment : Yes     Requires labs : Yes             Appointments  past 12m or future 3m with PCP    Date Provider   Last Visit   6/3/2022 Tho Plata MD   Next Visit   Visit date not found Tho Plata MD   ED visits in past 90 days: 0        Note composed:7:26 AM 11/05/2024

## 2024-11-05 NOTE — TELEPHONE ENCOUNTER
Care Due:                  Date            Visit Type   Department     Provider  --------------------------------------------------------------------------------    Last Visit: None Found      None         None Found  Next Visit: None Scheduled  None         None Found                                                            Last  Test          Frequency    Reason                     Performed    Due Date  --------------------------------------------------------------------------------    Office Visit  12 months..  fexofenadine, meloxicam..  Not Found    Overdue    CBC.........  12 months..  meloxicam................  02- 01-    [facility] Embedded Care Due Messages. Reference number: [id number].   11/05/2024 12:46:39 AM CST

## 2025-02-07 DIAGNOSIS — G89.29 CHRONIC PAIN OF BOTH KNEES: ICD-10-CM

## 2025-02-07 DIAGNOSIS — M25.562 CHRONIC PAIN OF BOTH KNEES: ICD-10-CM

## 2025-02-07 DIAGNOSIS — G89.29 CHRONIC PAIN OF RIGHT HIP: ICD-10-CM

## 2025-02-07 DIAGNOSIS — M25.551 CHRONIC PAIN OF RIGHT HIP: ICD-10-CM

## 2025-02-07 DIAGNOSIS — M25.561 CHRONIC PAIN OF BOTH KNEES: ICD-10-CM

## 2025-02-08 DIAGNOSIS — E78.2 MIXED HYPERLIPIDEMIA: Chronic | ICD-10-CM

## 2025-02-11 RX ORDER — MELOXICAM 15 MG/1
15 TABLET ORAL DAILY PRN
Qty: 30 TABLET | Refills: 0 | Status: SHIPPED | OUTPATIENT
Start: 2025-02-11

## 2025-02-11 RX ORDER — PRAVASTATIN SODIUM 40 MG/1
40 TABLET ORAL
Qty: 90 TABLET | Refills: 0 | Status: SHIPPED | OUTPATIENT
Start: 2025-02-11

## 2025-02-11 NOTE — TELEPHONE ENCOUNTER
Refill approved.  Due for routine physical with labs prior.  Please sched with me at [facility] Internal [facility].  Ok to have labs drawn a the patient's preferred location.      Thank you,  Aroldo

## 2025-03-15 DIAGNOSIS — M25.561 CHRONIC PAIN OF BOTH KNEES: ICD-10-CM

## 2025-03-15 DIAGNOSIS — G89.29 CHRONIC PAIN OF BOTH KNEES: ICD-10-CM

## 2025-03-15 DIAGNOSIS — G89.29 CHRONIC PAIN OF RIGHT HIP: ICD-10-CM

## 2025-03-15 DIAGNOSIS — M25.551 CHRONIC PAIN OF RIGHT HIP: ICD-10-CM

## 2025-03-15 DIAGNOSIS — M25.562 CHRONIC PAIN OF BOTH KNEES: ICD-10-CM

## 2025-03-15 NOTE — TELEPHONE ENCOUNTER
Care Due:                  Date            Visit Type   Department     Provider  --------------------------------------------------------------------------------    Last Visit: None Found      None         None Found  Next Visit: None Scheduled  None         None Found                                                            Last  Test          Frequency    Reason                     Performed    Due Date  --------------------------------------------------------------------------------    Office Visit  12 months..  fexofenadine, meloxicam,   Not Found    Overdue                             pravastatin..............    CBC.........  12 months..  meloxicam................  02- 01-    CMP.........  12 months..  meloxicam, pravastatin...  05- 04-    Lipid Panel.  12 months..  pravastatin..............  05- 04-    [facility] Embedded Care Due Messages. Reference number: [id number].   3/15/2025 8:36:14 AM CDT

## 2025-03-17 RX ORDER — MELOXICAM 15 MG/1
15 TABLET ORAL DAILY PRN
Qty: 30 TABLET | Refills: 0 | Status: SHIPPED | OUTPATIENT
Start: 2025-03-17

## 2025-03-17 NOTE — TELEPHONE ENCOUNTER
Refill Routing Note   Medication(s) are not appropriate for processing by [facility] for the following reason(s):        Outside of protocol    [facility] action(s):  Route   Requires labs : Yes     Requires appointment : Yes             Appointments  past 12m or future 3m with PCP    Date Provider   Last Visit   6/3/2022 Tho Plata MD   Next Visit   Visit date not found Toh Plata MD   ED visits in past 90 days: 0        Note composed:9:49 AM 03/17/2025

## 2025-03-17 NOTE — TELEPHONE ENCOUNTER
Refill approved.      Last seen by me June 2022.  Due for routine physical with labs prior.  Please sched with me at [facility] Internal [facility].  Ok to have labs drawn a the patient's preferred location.      Thank you,  Aroldo

## 2025-04-13 DIAGNOSIS — G89.29 CHRONIC PAIN OF BOTH KNEES: ICD-10-CM

## 2025-04-13 DIAGNOSIS — M25.562 CHRONIC PAIN OF BOTH KNEES: ICD-10-CM

## 2025-04-13 DIAGNOSIS — M25.551 CHRONIC PAIN OF RIGHT HIP: ICD-10-CM

## 2025-04-13 DIAGNOSIS — G89.29 CHRONIC PAIN OF RIGHT HIP: ICD-10-CM

## 2025-04-13 DIAGNOSIS — M25.561 CHRONIC PAIN OF BOTH KNEES: ICD-10-CM

## 2025-04-13 NOTE — TELEPHONE ENCOUNTER
No care due was identified.  [facility] Embedded Care Due Messages. Reference number: [id number].   4/13/2025 7:11:44 AM CDT

## 2025-04-14 RX ORDER — MELOXICAM 15 MG/1
15 TABLET ORAL DAILY PRN
Qty: 30 TABLET | Refills: 0 | Status: SHIPPED | OUTPATIENT
Start: 2025-04-14

## 2025-04-14 NOTE — TELEPHONE ENCOUNTER
Refill Routing Note   Medication(s) are not appropriate for processing by [facility] for the following reason(s):        Outside of protocol    [facility] action(s):  Route               Appointments  past 12m or future 3m with PCP    Date Provider   Last Visit   6/3/2022 Tho Plata MD   Next Visit   Visit date not found Tho Plata MD   ED visits in past 90 days: 0        Note composed:11:01 AM 04/14/2025

## 2025-04-14 NOTE — TELEPHONE ENCOUNTER
Refill approved.  Due for routine physical with labs prior.  Please sched with me Internal Medicine.  Ok to have labs drawn a the patient's preferred location.      Thank you,  Aroldo

## 2025-05-09 DIAGNOSIS — E78.2 MIXED HYPERLIPIDEMIA: Chronic | ICD-10-CM

## 2025-05-09 RX ORDER — PRAVASTATIN SODIUM 40 MG/1
40 TABLET ORAL
Qty: 30 TABLET | Refills: 0 | Status: SHIPPED | OUTPATIENT
Start: 2025-05-09

## 2025-05-09 NOTE — TELEPHONE ENCOUNTER
No care due was identified.  [facility] Embedded Care Due Messages. Reference number: [id number].   5/09/2025 12:30:24 AM CDT

## 2025-05-10 DIAGNOSIS — M25.562 CHRONIC PAIN OF BOTH KNEES: ICD-10-CM

## 2025-05-10 DIAGNOSIS — M25.551 CHRONIC PAIN OF RIGHT HIP: ICD-10-CM

## 2025-05-10 DIAGNOSIS — G89.29 CHRONIC PAIN OF BOTH KNEES: ICD-10-CM

## 2025-05-10 DIAGNOSIS — G89.29 CHRONIC PAIN OF RIGHT HIP: ICD-10-CM

## 2025-05-10 DIAGNOSIS — M25.561 CHRONIC PAIN OF BOTH KNEES: ICD-10-CM

## 2025-05-10 NOTE — TELEPHONE ENCOUNTER
No care due was identified.  [facility] Embedded Care Due Messages. Reference number: [id number].   5/10/2025 8:37:36 AM CDT

## 2025-05-12 RX ORDER — MELOXICAM 15 MG/1
15 TABLET ORAL DAILY PRN
Qty: 30 TABLET | Refills: 0 | OUTPATIENT
Start: 2025-05-12

## 2025-05-19 ENCOUNTER — TELEPHONE (OUTPATIENT)
Dept: INTERNAL MEDICINE | Facility: CLINIC | Age: 45
End: 2025-05-19
Payer: COMMERCIAL

## 2025-05-20 ENCOUNTER — OFFICE VISIT (OUTPATIENT)
Dept: INTERNAL MEDICINE | Facility: CLINIC | Age: 45
End: 2025-05-20
Payer: COMMERCIAL

## 2025-05-20 VITALS
HEIGHT: 68 IN | WEIGHT: 180.56 LBS | OXYGEN SATURATION: 98 % | BODY MASS INDEX: 27.36 KG/M2 | SYSTOLIC BLOOD PRESSURE: 124 MMHG | HEART RATE: 66 BPM | DIASTOLIC BLOOD PRESSURE: 78 MMHG

## 2025-05-20 DIAGNOSIS — M70.62 GREATER TROCHANTERIC BURSITIS OF BOTH HIPS: ICD-10-CM

## 2025-05-20 DIAGNOSIS — Z12.11 ENCOUNTER FOR FIT (FECAL IMMUNOCHEMICAL TEST) SCREENING: ICD-10-CM

## 2025-05-20 DIAGNOSIS — Z00.00 ROUTINE MEDICAL EXAM: Primary | ICD-10-CM

## 2025-05-20 DIAGNOSIS — E78.2 MIXED HYPERLIPIDEMIA: Chronic | ICD-10-CM

## 2025-05-20 DIAGNOSIS — M70.61 GREATER TROCHANTERIC BURSITIS OF BOTH HIPS: ICD-10-CM

## 2025-05-20 DIAGNOSIS — Z78.9 ELECTRONIC CIGARETTE USE: Chronic | ICD-10-CM

## 2025-05-20 PROCEDURE — 3008F BODY MASS INDEX DOCD: CPT | Mod: CPTII,S$GLB,, | Performed by: INTERNAL MEDICINE

## 2025-05-20 PROCEDURE — 3074F SYST BP LT 130 MM HG: CPT | Mod: CPTII,S$GLB,, | Performed by: INTERNAL MEDICINE

## 2025-05-20 PROCEDURE — 3078F DIAST BP <80 MM HG: CPT | Mod: CPTII,S$GLB,, | Performed by: INTERNAL MEDICINE

## 2025-05-20 PROCEDURE — 99396 PREV VISIT EST AGE 40-64: CPT | Mod: S$GLB,,, | Performed by: INTERNAL MEDICINE

## 2025-05-20 PROCEDURE — 1159F MED LIST DOCD IN RCRD: CPT | Mod: CPTII,S$GLB,, | Performed by: INTERNAL MEDICINE

## 2025-05-20 PROCEDURE — 1160F RVW MEDS BY RX/DR IN RCRD: CPT | Mod: CPTII,S$GLB,, | Performed by: INTERNAL MEDICINE

## 2025-05-20 PROCEDURE — 99214 OFFICE O/P EST MOD 30 MIN: CPT | Mod: 25,S$GLB,, | Performed by: INTERNAL MEDICINE

## 2025-05-20 PROCEDURE — 99999 PR PBB SHADOW E&M-EST. PATIENT-LVL IV: CPT | Mod: PBBFAC,,, | Performed by: INTERNAL MEDICINE

## 2025-05-20 RX ORDER — DICLOFENAC SODIUM 50 MG/1
50 TABLET, DELAYED RELEASE ORAL 2 TIMES DAILY
Qty: 60 TABLET | Refills: 1 | Status: SHIPPED | OUTPATIENT
Start: 2025-05-20

## 2025-05-20 RX ORDER — CETIRIZINE HYDROCHLORIDE 10 MG/1
10 TABLET ORAL DAILY
COMMUNITY

## 2025-05-20 RX ORDER — AZITHROMYCIN 250 MG/1
TABLET, FILM COATED ORAL
COMMUNITY
Start: 2024-12-04

## 2025-05-20 NOTE — PROGRESS NOTES
Assessment & Plan  Assessment & Plan    IMPRESSION:  - Assessed hip pain, likely due to tendinitis or bursitis of the IT band or trochanteric bursa.  - Evaluated concern about decreased energy; determined unlikely to be testosterone deficiency based on lack of associated symptoms.  - Reviewed significant improvement in cholesterol levels from previous labs.  - Discussed colon cancer screening options, recommending stool test as preferred method given risk factors and preferences.  - Discontinued baby aspirin due to recent studies showing limited benefit and potential bleeding risk for heart disease prevention.    PLAN SUMMARY:  - Ordered fasting labs for routine physical and cholesterol management  - Ordered stool test for colorectal cancer screening  - Discontinued Meloxicam  - Prescribed diclofenac to be taken every 12 hours as needed  - Continue vitamin C, garlic, and fish oil (omega-3) supplements  - Apply ice to hip area for pain relief  - Use topical analgesics like Icy Hot when ice is not available  - Increase fluid intake and maintain adequate electrolyte balance  - Continue efforts to reduce vaping frequency  - Follow up for review of lab results and to evaluate need for cholesterol medication    HYPERLIPIDEMIA:  - Continue garlic and fish oil (omega-3) supplements.  - Noted significant improvement in cholesterol levels, with total cholesterol decreasing from 275 to 198 and triglycerides from almost 500 to 149.  - Ordered fasting labs for routine physical to reassess cholesterol management.  - Will evaluate need for cholesterol medication to prevent cardiovascular events based on new results.  - Will monitor cholesterol levels closely due to family history of heart disease.    HIP PAIN:  - Evaluated hip pain occurring during ground-level work, especially in weight-bearing positions.  - Physical exam revealed likely tendinitis or bursitis, with no groin pain during leg elevation.  - Reviewed previous  x-rays.  - Discontinued Meloxicam (currently taken nightly) and prescribed diclofenac to be taken every 12 hours as needed, especially before and after physically demanding work.  - Explained hip anatomy including IT band and bursa and their relation to the pain.  - Instructed patient to apply ice to hip area for pain relief and recommended using topical analgesics like Icy Hot when ice is not available.    DIZZINESS:  - Mr. Ferreira experiences dizziness when standing up too quickly.  - Educated on importance of hydration for preventing orthostatic lightheadedness.  - Recommend increasing fluid intake and maintaining adequate electrolyte balance to address potential dehydration.    CONSTIPATION:  - Mr. Ferreira reports bowel movements every 2-3 days with occasional bloating.  - Ordered stool test for colorectal cancer screening.  - Instructed patient to complete and return stool test kit, which involves collecting a sample during a bowel movement.    NICOTINE DEPENDENCE:  - Mr. Ferreira uses a vape as a cigarette replacement, with each device lasting 7-9 days.  - Has abstained from c  igarette smoking for over 1 year and 2 months.  - Advised to continue efforts to reduce vaping frequency.  - Educated about potential pulmonary risks associated with vaping.    FATIGUE:  - Mr. Ferreira reports decreased energy levels without muscle mass loss.  - Assessed fatigue is likely due to occupational demands and aging process rather than testosterone deficiency.  - Continue vitamin C supplementation.    FAMILY HISTORY OF CARDIOVASCULAR DISEASE:  - Noted family history of cardiovascular disease.  - Discussed risks associated with testosterone replacement therapy, including increased risk of myocardial infarction, cerebrovascular accident, and certain prostatic neoplasms.    FOLLOW-UP:  - Follow up for review of lab results.         1. Routine medical exam  -    Discussed healthy diet, regular exercise, necessary labs, age  appropriate cancer screening, and routine vaccinations.       2. Greater trochanteric bursitis of both hips  -     diclofenac (VOLTAREN) 50 MG EC tablet; Take 1 tablet (50 mg total) by mouth 2 (two) times daily. Take with food  Dispense: 60 tablet; Refill: 1    3. Encounter for FIT (fecal immunochemical test) screening  -     Fecal Immunochemical Test (iFOBT); Future; Expected date: 05/20/2025    4. Mixed hyperlipidemia  Overview:  The 10-year ASCVD risk score (Canton MARISA Jr., et al., 2013) is: 7.9%          5. Electronic cigarette use  Assessment & Plan:  He has cut down.  I counseled the patient on smoking cessation, risks associated with smoking, having a quit date, nicotine replacement, medications that can aid in cessation, and having a plan for future cravings.              Follow-up: No follow-ups on file.    This note was generated with the assistance of ambient listening technology. Verbal consent was obtained by the patient and accompanying visitor(s) for the recording of patient appointment to facilitate this note. I attest to having reviewed and edited the generated note for accuracy, though some syntax or spelling errors may persist. Please contact the author of this note for any clarification.      ______________________________________________________________________    Chief Complaint  Chief Complaint   Patient presents with    Annual Exam       History of Present Illness    CHIEF COMPLAINT:  Mr. Ferreira presents today for routine physical    MUSCULOSKELETAL:  He reports hip pain located on the side with occasional groin pain, particularly when working low to ground or on knees. He previously experienced difficulty lifting leg to get into picnic table. He takes Meloxicam with partial relief, noting pain is manageable when not working on the ground.    CARDIOVASCULAR:  He experiences dizziness upon standing quickly. Family history is significant for heart disease. Cholesterol levels have improved  "significantly with total cholesterol decreasing from 275 to 198 and triglycerides reducing from nearly 500 to 149.    GASTROINTESTINAL:  He has bowel movements every 2-3 days with occasional bloating. He denies straining with bowel movements.    SOCIAL HISTORY:  He quit cigarettes over a year ago but currently uses disposable vape devices, with one device lasting 7-9 days.    CONSTITUTIONAL:  He reports feeling less energetic, describing a sense of "slowing down" compared to his previous level of activity.    MEDICATIONS AND SUPPLEMENTS:  He takes vitamin C, garlic supplement, fish oil, baby aspirin, and Meloxicam. He denies taking biotin.            The 10-year ASCVD risk score (Sofy URIAS, et al., 2019) is: 7.8%    Values used to calculate the score:      Age: 44 years      Sex: Male      Is Non- : No      Diabetic: No      Tobacco smoker: Yes      Systolic Blood Pressure: 124 mmHg      Is BP treated: No      HDL Cholesterol: 35 mg/dL      Total Cholesterol: 198 mg/dL     ROS  Review of Systems   Constitutional:  Negative for chills, fever and unexpected weight change.   Respiratory:  Negative for cough, chest tightness, shortness of breath and wheezing.    Cardiovascular:  Negative for chest pain, palpitations and leg swelling.   Gastrointestinal:  Negative for abdominal pain and blood in stool.   Genitourinary:  Negative for decreased urine volume, dysuria and hematuria.   Musculoskeletal:  Positive for arthralgias. Negative for myalgias.   Neurological:  Negative for dizziness, weakness, light-headedness and headaches.   Psychiatric/Behavioral:  Negative for decreased concentration, dysphoric mood, sleep disturbance and suicidal ideas.            Physical Exam  Vitals:    05/20/25 1525   BP: 124/78   BP Location: Left arm   Patient Position: Sitting   Pulse: 66   SpO2: 98%   Weight: 81.9 kg (180 lb 8.9 oz)   Height: 5' 8" (1.727 m)    Body mass index is 27.45 kg/m².  Weight: 81.9 kg (180 " "lb 8.9 oz)   Height: 5' 8" (172.7 cm)   Physical Exam  Constitutional:       General: He is not in acute distress.     Appearance: He is well-developed.   HENT:      Head: Normocephalic and atraumatic.   Eyes:      General: Lids are normal. No scleral icterus.     Conjunctiva/sclera: Conjunctivae normal.      Pupils: Pupils are equal, round, and reactive to light.   Neck:      Thyroid: No thyromegaly.      Vascular: No carotid bruit or JVD.   Cardiovascular:      Rate and Rhythm: Normal rate and regular rhythm.      Heart sounds: Normal heart sounds. No murmur heard.     No friction rub. No S3 or S4 sounds.   Pulmonary:      Effort: Pulmonary effort is normal.      Breath sounds: Normal breath sounds. No wheezing, rhonchi or rales.   Abdominal:      General: Bowel sounds are normal. There is no distension.      Palpations: Abdomen is soft.      Tenderness: There is no guarding.   Musculoskeletal:      Cervical back: Full passive range of motion without pain and neck supple.      Right lower leg: No edema.      Left lower leg: No edema.   Skin:     General: Skin is warm and dry.      Findings: No rash.   Neurological:      Mental Status: He is alert and oriented to person, place, and time.   Psychiatric:         Speech: Speech normal.         Behavior: Behavior normal.         Thought Content: Thought content normal.               "

## 2025-05-21 PROBLEM — Z78.9 ELECTRONIC CIGARETTE USE: Chronic | Status: ACTIVE | Noted: 2019-06-11

## 2025-05-21 NOTE — ASSESSMENT & PLAN NOTE
He has cut down.  I counseled the patient on smoking cessation, risks associated with smoking, having a quit date, nicotine replacement, medications that can aid in cessation, and having a plan for future cravings.

## 2025-05-23 ENCOUNTER — LAB VISIT (OUTPATIENT)
Dept: LAB | Facility: HOSPITAL | Age: 45
End: 2025-05-23
Attending: NURSE PRACTITIONER
Payer: COMMERCIAL

## 2025-05-23 DIAGNOSIS — E78.2 MIXED HYPERLIPIDEMIA: Chronic | ICD-10-CM

## 2025-05-23 LAB
ALBUMIN SERPL BCP-MCNC: 3.8 G/DL (ref 3.5–5.2)
ALP SERPL-CCNC: 97 UNIT/L (ref 40–150)
ALT SERPL W/O P-5'-P-CCNC: 33 UNIT/L (ref 10–44)
ANION GAP (OHS): 9 MMOL/L (ref 8–16)
AST SERPL-CCNC: 28 UNIT/L (ref 11–45)
BILIRUB SERPL-MCNC: 0.6 MG/DL (ref 0.1–1)
BUN SERPL-MCNC: 11 MG/DL (ref 6–20)
CALCIUM SERPL-MCNC: 9.5 MG/DL (ref 8.7–10.5)
CHLORIDE SERPL-SCNC: 109 MMOL/L (ref 95–110)
CHOLEST SERPL-MCNC: 198 MG/DL (ref 120–199)
CHOLEST/HDLC SERPL: 4.4 {RATIO} (ref 2–5)
CO2 SERPL-SCNC: 22 MMOL/L (ref 23–29)
CREAT SERPL-MCNC: 1.3 MG/DL (ref 0.5–1.4)
GFR SERPLBLD CREATININE-BSD FMLA CKD-EPI: >60 ML/MIN/1.73/M2
GLUCOSE SERPL-MCNC: 106 MG/DL (ref 70–110)
HDLC SERPL-MCNC: 45 MG/DL (ref 40–75)
HDLC SERPL: 22.7 % (ref 20–50)
LDLC SERPL CALC-MCNC: 114 MG/DL (ref 63–159)
NONHDLC SERPL-MCNC: 153 MG/DL
POTASSIUM SERPL-SCNC: 4.1 MMOL/L (ref 3.5–5.1)
PROT SERPL-MCNC: 7 GM/DL (ref 6–8.4)
SODIUM SERPL-SCNC: 140 MMOL/L (ref 136–145)
TRIGL SERPL-MCNC: 195 MG/DL (ref 30–150)

## 2025-05-23 PROCEDURE — 80051 ELECTROLYTE PANEL: CPT

## 2025-05-23 PROCEDURE — 36415 COLL VENOUS BLD VENIPUNCTURE: CPT | Mod: PO

## 2025-05-23 PROCEDURE — 80061 LIPID PANEL: CPT

## 2025-05-24 ENCOUNTER — RESULTS FOLLOW-UP (OUTPATIENT)
Dept: INTERNAL MEDICINE | Facility: CLINIC | Age: 45
End: 2025-05-24

## 2025-05-24 NOTE — PROGRESS NOTES
These lab results are looking fine, and you do not need any change in your medications at this time.     Sincerely,  Tho Plata MD    The 10-year ASCVD risk score (Sofy URIAS, et al., 2019) is: 5.8%    Values used to calculate the score:      Age: 44 years      Sex: Male      Is Non- : No      Diabetic: No      Tobacco smoker: Yes      Systolic Blood Pressure: 124 mmHg      Is BP treated: No      HDL Cholesterol: 45 mg/dL      Total Cholesterol: 198 mg/dL

## 2025-05-31 ENCOUNTER — LAB VISIT (OUTPATIENT)
Dept: LAB | Facility: HOSPITAL | Age: 45
End: 2025-05-31
Attending: INTERNAL MEDICINE
Payer: COMMERCIAL

## 2025-05-31 DIAGNOSIS — Z12.11 ENCOUNTER FOR FIT (FECAL IMMUNOCHEMICAL TEST) SCREENING: ICD-10-CM

## 2025-05-31 PROCEDURE — 82274 ASSAY TEST FOR BLOOD FECAL: CPT

## 2025-06-02 ENCOUNTER — RESULTS FOLLOW-UP (OUTPATIENT)
Dept: INTERNAL MEDICINE | Facility: CLINIC | Age: 45
End: 2025-06-02

## 2025-06-02 LAB — OB PNL STL IA: NEGATIVE

## 2025-06-08 ENCOUNTER — OFFICE VISIT (OUTPATIENT)
Dept: URGENT CARE | Facility: CLINIC | Age: 45
End: 2025-06-08
Payer: COMMERCIAL

## 2025-06-08 VITALS
HEART RATE: 96 BPM | TEMPERATURE: 98 F | WEIGHT: 175.94 LBS | SYSTOLIC BLOOD PRESSURE: 138 MMHG | OXYGEN SATURATION: 98 % | BODY MASS INDEX: 26.75 KG/M2 | DIASTOLIC BLOOD PRESSURE: 90 MMHG

## 2025-06-08 DIAGNOSIS — L03.90 CELLULITIS, UNSPECIFIED CELLULITIS SITE: Primary | ICD-10-CM

## 2025-06-08 DIAGNOSIS — T63.301A SPIDER BITE WOUND, ACCIDENTAL OR UNINTENTIONAL, INITIAL ENCOUNTER: ICD-10-CM

## 2025-06-08 PROCEDURE — 96372 THER/PROPH/DIAG INJ SC/IM: CPT | Mod: 59,S$GLB,,

## 2025-06-08 RX ORDER — ONDANSETRON 8 MG/1
8 TABLET, FILM COATED ORAL EVERY 8 HOURS PRN
Qty: 10 TABLET | Refills: 0 | Status: SHIPPED | OUTPATIENT
Start: 2025-06-08

## 2025-06-08 RX ORDER — CEPHALEXIN 500 MG/1
500 CAPSULE ORAL EVERY 12 HOURS
Qty: 14 CAPSULE | Refills: 0 | Status: SHIPPED | OUTPATIENT
Start: 2025-06-08 | End: 2025-06-15

## 2025-06-08 RX ORDER — SILVER SULFADIAZINE 10 G/1000G
CREAM TOPICAL 2 TIMES DAILY
Qty: 25 G | Refills: 0 | Status: SHIPPED | OUTPATIENT
Start: 2025-06-08

## 2025-06-08 RX ORDER — ONDANSETRON 8 MG/1
8 TABLET, ORALLY DISINTEGRATING ORAL
Status: COMPLETED | OUTPATIENT
Start: 2025-06-08 | End: 2025-06-08

## 2025-06-08 RX ORDER — DEXAMETHASONE SODIUM PHOSPHATE 10 MG/ML
10 INJECTION INTRAMUSCULAR; INTRAVENOUS
Status: COMPLETED | OUTPATIENT
Start: 2025-06-08 | End: 2025-06-08

## 2025-06-08 RX ORDER — SILVER SULFADIAZINE 10 G/1000G
CREAM TOPICAL
Status: COMPLETED | OUTPATIENT
Start: 2025-06-08 | End: 2025-06-08

## 2025-06-08 RX ADMIN — ONDANSETRON 8 MG: 8 TABLET, ORALLY DISINTEGRATING ORAL at 01:06

## 2025-06-08 RX ADMIN — SILVER SULFADIAZINE: 10 CREAM TOPICAL at 01:06

## 2025-06-08 RX ADMIN — DEXAMETHASONE SODIUM PHOSPHATE 10 MG: 10 INJECTION INTRAMUSCULAR; INTRAVENOUS at 01:06

## 2025-06-08 NOTE — PROGRESS NOTES
Subjective:      Patient ID: Shady Ferreira is a 44 y.o. male.    Vitals:  weight is 79.8 kg (175 lb 14.8 oz). His temperature is 97.6 °F (36.4 °C). His blood pressure is 138/90 (abnormal) and his pulse is 96. His oxygen saturation is 98%.     Chief Complaint: Insect Bite    Pt states he has a bite on his upper buttock crack since yesterday morning, states he has tried using abx ointment which didn't help, states he now is  nauseated and diarrhea also feels weak since this morning.    Insect Bite  This is a new problem. The current episode started yesterday. The problem occurs intermittently. The problem has been rapidly worsening. Associated symptoms include nausea and weakness. Treatments tried: antibiotic ointments. The treatment provided no relief.       Gastrointestinal:  Positive for nausea.      Objective:     Physical Exam   Constitutional: He is oriented to person, place, and time.  Non-toxic appearance. He does not appear ill. No distress.   HENT:   Head: Normocephalic.   Ears:   Right Ear: External ear normal.   Left Ear: External ear normal.   Nose: Nose normal.   Mouth/Throat: Mucous membranes are moist.   Eyes: Conjunctivae are normal.   Cardiovascular: Normal rate.   Pulmonary/Chest: Effort normal.   Musculoskeletal: Normal range of motion.         General: Normal range of motion.   Neurological: He is alert and oriented to person, place, and time.   Skin: Skin is dry.         Comments: Left gluteal cleft with round area of erythema, indurated, white head popped and draining.    Psychiatric: His behavior is normal.       Assessment:     1. Cellulitis, unspecified cellulitis site    2. Spider bite wound, accidental or unintentional, initial encounter        Plan:       Cellulitis, unspecified cellulitis site  -     ondansetron disintegrating tablet 8 mg  -     silver sulfADIAZINE 1% cream  -     cephALEXin (KEFLEX) 500 MG capsule; Take 1 capsule (500 mg total) by mouth every 12 (twelve) hours. for 7  days  Dispense: 14 capsule; Refill: 0  -     silver sulfADIAZINE 1% (SILVADENE) 1 % cream; Apply topically 2 (two) times daily.  Dispense: 25 g; Refill: 0  -     ondansetron (ZOFRAN) 8 MG tablet; Take 1 tablet (8 mg total) by mouth every 8 (eight) hours as needed for Nausea.  Dispense: 10 tablet; Refill: 0  -     dexAMETHasone injection 10 mg    Spider bite wound, accidental or unintentional, initial encounter  -     ondansetron disintegrating tablet 8 mg  -     silver sulfADIAZINE 1% cream  -     cephALEXin (KEFLEX) 500 MG capsule; Take 1 capsule (500 mg total) by mouth every 12 (twelve) hours. for 7 days  Dispense: 14 capsule; Refill: 0  -     silver sulfADIAZINE 1% (SILVADENE) 1 % cream; Apply topically 2 (two) times daily.  Dispense: 25 g; Refill: 0  -     ondansetron (ZOFRAN) 8 MG tablet; Take 1 tablet (8 mg total) by mouth every 8 (eight) hours as needed for Nausea.  Dispense: 10 tablet; Refill: 0  -     dexAMETHasone injection 10 mg      Likely spider bite given central area of necrosis and rapid progression. Will treat as above.   RTC PRN

## 2025-06-08 NOTE — LETTER
June 8, 2025      [facility]  [address] [facility], [address]  [place] [address]  Phone: [phone]  Fax: [phone]       Patient: Shady Ferreira   YOB: 1980  Date of Visit: 06/08/2025    To Whom It May Concern:    Shady Ferreira  was at [facility] on 06/08/2025. The patient may return to work/school on 06.10.25 with no restrictions. If you have any questions or concerns, or if I can be of further assistance, please do not hesitate to contact me.    Sincerely,    Stacy Hill MD

## 2025-06-11 DIAGNOSIS — E78.2 MIXED HYPERLIPIDEMIA: Chronic | ICD-10-CM

## 2025-06-11 RX ORDER — PRAVASTATIN SODIUM 40 MG/1
40 TABLET ORAL
Qty: 90 TABLET | Refills: 3 | Status: SHIPPED | OUTPATIENT
Start: 2025-06-11

## 2025-06-23 DIAGNOSIS — M70.61 GREATER TROCHANTERIC BURSITIS OF BOTH HIPS: ICD-10-CM

## 2025-06-23 DIAGNOSIS — M70.62 GREATER TROCHANTERIC BURSITIS OF BOTH HIPS: ICD-10-CM

## 2025-06-23 NOTE — TELEPHONE ENCOUNTER
Care Due:                  Date            Visit Type   Department     Provider  --------------------------------------------------------------------------------                                EP -                              PRIMARY      [facility] INTER[facility]  Last Visit: 05-      CARE ([facility])   MEDICINE       Tho Plata  Next Visit: None Scheduled  None         None Found                                                            Last  Test          Frequency    Reason                     Performed    Due Date  --------------------------------------------------------------------------------    CBC.........  12 months..  diclofenac, meloxicam....  02- 01-    [facility] Embedded Care Due Messages. Reference number: [id number].   6/23/2025 5:17:14 PM CDT

## 2025-06-24 RX ORDER — DICLOFENAC SODIUM 50 MG/1
50 TABLET, DELAYED RELEASE ORAL 2 TIMES DAILY WITH MEALS
Qty: 60 TABLET | Refills: 1 | Status: SHIPPED | OUTPATIENT
Start: 2025-06-24

## 2025-06-24 NOTE — TELEPHONE ENCOUNTER
Refill Routing Note   Medication(s) are not appropriate for processing by [facility] for the following reason(s):        Outside of protocol    [facility] action(s):  Route   Requires labs : Yes             Appointments  past 12m or future 3m with PCP    Date Provider   Last Visit   5/20/2025 Tho Plata MD   Next Visit   Visit date not found Tho Plata MD   ED visits in past 90 days: 0        Note composed:7:35 AM 06/24/2025